# Patient Record
Sex: FEMALE | Race: WHITE | NOT HISPANIC OR LATINO | Employment: FULL TIME | URBAN - NONMETROPOLITAN AREA
[De-identification: names, ages, dates, MRNs, and addresses within clinical notes are randomized per-mention and may not be internally consistent; named-entity substitution may affect disease eponyms.]

---
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
## 2024-04-14 ENCOUNTER — OFFICE VISIT (OUTPATIENT)
Dept: FAMILY MEDICINE | Facility: CLINIC | Age: 19
End: 2024-04-14

## 2024-04-14 VITALS
RESPIRATION RATE: 18 BRPM | HEART RATE: 110 BPM | WEIGHT: 101 LBS | OXYGEN SATURATION: 98 % | HEIGHT: 65 IN | BODY MASS INDEX: 16.83 KG/M2 | SYSTOLIC BLOOD PRESSURE: 113 MMHG | TEMPERATURE: 98 F | DIASTOLIC BLOOD PRESSURE: 78 MMHG

## 2024-04-14 DIAGNOSIS — N75.0 INFECTED CYST OF BARTHOLIN'S GLAND DUCT: Primary | ICD-10-CM

## 2024-04-14 PROCEDURE — 99051 MED SERV EVE/WKEND/HOLIDAY: CPT | Mod: ,,, | Performed by: FAMILY MEDICINE

## 2024-04-14 PROCEDURE — 96372 THER/PROPH/DIAG INJ SC/IM: CPT | Mod: ,,, | Performed by: FAMILY MEDICINE

## 2024-04-14 PROCEDURE — 99213 OFFICE O/P EST LOW 20 MIN: CPT | Mod: 25,,, | Performed by: FAMILY MEDICINE

## 2024-04-14 RX ORDER — KETOROLAC TROMETHAMINE 30 MG/ML
30 INJECTION, SOLUTION INTRAMUSCULAR; INTRAVENOUS
Status: COMPLETED | OUTPATIENT
Start: 2024-04-14 | End: 2024-04-14

## 2024-04-14 RX ORDER — CEFTRIAXONE 500 MG/1
500 INJECTION, POWDER, FOR SOLUTION INTRAMUSCULAR; INTRAVENOUS
Status: COMPLETED | OUTPATIENT
Start: 2024-04-14 | End: 2024-04-14

## 2024-04-14 RX ORDER — CLINDAMYCIN HYDROCHLORIDE 300 MG/1
300 CAPSULE ORAL 3 TIMES DAILY
Qty: 21 CAPSULE | Refills: 0 | Status: SHIPPED | OUTPATIENT
Start: 2024-04-14 | End: 2024-04-21

## 2024-04-14 RX ORDER — IBUPROFEN 600 MG/1
600 TABLET ORAL EVERY 6 HOURS PRN
Qty: 20 TABLET | Refills: 0 | Status: SHIPPED | OUTPATIENT
Start: 2024-04-14 | End: 2024-05-29

## 2024-04-14 RX ADMIN — KETOROLAC TROMETHAMINE 30 MG: 30 INJECTION, SOLUTION INTRAMUSCULAR; INTRAVENOUS at 05:04

## 2024-04-14 RX ADMIN — CEFTRIAXONE 500 MG: 500 INJECTION, POWDER, FOR SOLUTION INTRAMUSCULAR; INTRAVENOUS at 05:04

## 2024-04-14 NOTE — LETTER
April 14, 2024      [facility]  [address] [facility] [place] [address]  Phone: [phone]  Fax: [phone]       Patient: Merle Hinojosa   YOB: 2005  Date of Visit: 04/14/2024    To Whom It May Concern:    Jimmy Hinojosa  was at [facility] on 04/14/2024. The patient may return to work/school on 04/18/2024 with no restrictions. If you have any questions or concerns, or if I can be of further assistance, please do not hesitate to contact me.    Sincerely,    Theo Salas LPN

## 2024-04-14 NOTE — PROGRESS NOTES
Subjective:       Patient ID: Merle Hinojosa is a 18 y.o. female.    Chief Complaint: Vaginal Pain (Having some pains since this morning. Had a cyst about 6 months ago and was treated.)    Pt reports having infected cyst 6 months ago in her vagina. Was treated and resolved then but similar feeling started a couple of days ago. No discharge. Patient does not have a gynecologist.     Vaginal Pain  The patient's pertinent negatives include no pelvic pain. Pertinent negatives include no abdominal pain, back pain, chills, constipation, diarrhea, dysuria, fever, flank pain, frequency, headaches, hematuria, nausea, rash, sore throat, urgency or vomiting.     Review of Systems   Constitutional:  Negative for activity change, appetite change, chills, diaphoresis, fatigue, fever and unexpected weight change.   HENT:  Negative for nasal congestion, dental problem, drooling, ear discharge, ear pain, facial swelling, hearing loss, mouth sores, nosebleeds, postnasal drip, rhinorrhea, sinus pressure/congestion, sneezing, sore throat, tinnitus, trouble swallowing, voice change and goiter.    Eyes:  Negative for photophobia, discharge, itching and visual disturbance.   Respiratory:  Negative for apnea, cough, choking, chest tightness, shortness of breath, wheezing and stridor.    Cardiovascular:  Negative for chest pain, palpitations, leg swelling and claudication.   Gastrointestinal:  Negative for abdominal distention, abdominal pain, anal bleeding, blood in stool, change in bowel habit, constipation, diarrhea, nausea and vomiting.   Endocrine: Negative for cold intolerance, heat intolerance, polydipsia, polyphagia and polyuria.   Genitourinary:  Positive for vaginal pain. Negative for bladder incontinence, decreased urine volume, difficulty urinating, dysuria, enuresis, flank pain, frequency, hematuria, nocturia, pelvic pain and urgency.   Musculoskeletal:  Negative for arthralgias, back pain, gait problem, joint swelling, leg  pain, myalgias, neck pain, neck stiffness and joint deformity.   Integumentary:  Negative for pallor, rash, wound, breast mass and breast tenderness.   Allergic/Immunologic: Negative for environmental allergies, food allergies and immunocompromised state.   Neurological:  Negative for dizziness, vertigo, tremors, seizures, syncope, facial asymmetry, speech difficulty, weakness, light-headedness, numbness, headaches, memory loss and coordination difficulties.   Hematological:  Negative for adenopathy. Does not bruise/bleed easily.   Psychiatric/Behavioral:  Negative for agitation, behavioral problems, confusion, decreased concentration, dysphoric mood, hallucinations, self-injury, sleep disturbance and suicidal ideas. The patient is not nervous/anxious and is not hyperactive.    Breast: Negative for mass and tenderness        Objective:      Physical Exam  Vitals reviewed.   Constitutional:       Appearance: Normal appearance.   HENT:      Head: Normocephalic and atraumatic.      Right Ear: Tympanic membrane, ear canal and external ear normal.      Left Ear: Tympanic membrane, ear canal and external ear normal.      Nose: Nose normal.      Mouth/Throat:      Mouth: Mucous membranes are moist.      Pharynx: Oropharynx is clear.   Eyes:      Extraocular Movements: Extraocular movements intact.      Conjunctiva/sclera: Conjunctivae normal.      Pupils: Pupils are equal, round, and reactive to light.   Cardiovascular:      Rate and Rhythm: Normal rate and regular rhythm.      Pulses: Normal pulses.      Heart sounds: Normal heart sounds.   Pulmonary:      Effort: Pulmonary effort is normal.      Breath sounds: Normal breath sounds.   Abdominal:      General: Bowel sounds are normal.      Palpations: Abdomen is soft.   Genitourinary:     Comments: Swollen cyst near vaginal introitus, very ttp, not draining. Female Chaperone nurse Arturo present during exam.   Musculoskeletal:         General: Normal range of motion.       Cervical back: Normal range of motion and neck supple.   Skin:     General: Skin is warm and dry.   Neurological:      General: No focal deficit present.      Mental Status: She is alert. Mental status is at baseline.   Psychiatric:         Mood and Affect: Mood normal.         Behavior: Behavior normal.         Thought Content: Thought content normal.         Judgment: Judgment normal.         Assessment:       1. Infected cyst of Bartholin's gland duct        Plan:     Infected cyst of Bartholin's gland duct  -     clindamycin (CLEOCIN) 300 MG capsule; Take 1 capsule (300 mg total) by mouth 3 (three) times daily. for 7 days  Dispense: 21 capsule; Refill: 0  -     ibuprofen (ADVIL,MOTRIN) 600 MG tablet; Take 1 tablet (600 mg total) by mouth every 6 (six) hours as needed for Pain.  Dispense: 20 tablet; Refill: 0  -     ketorolac injection 30 mg  -     cefTRIAXone injection 500 mg       Will treat for infected cyst, recommend patient f/u with gynecology if antibiotics and warm compresses do not resolve this.

## 2024-04-30 DIAGNOSIS — Z32.00 VISIT FOR CONFIRMATION OF PREGNANCY TEST RESULT WITH PHYSICAL EXAM: Primary | ICD-10-CM

## 2024-05-21 ENCOUNTER — HOSPITAL ENCOUNTER (OUTPATIENT)
Dept: RADIOLOGY | Facility: HOSPITAL | Age: 19
Discharge: HOME | End: 2024-05-21
Attending: OBSTETRICS & GYNECOLOGY
Payer: MEDICAID

## 2024-05-21 ENCOUNTER — OFFICE VISIT (OUTPATIENT)
Dept: OBSTETRICS AND GYNECOLOGY | Facility: CLINIC | Age: 19
End: 2024-05-21
Payer: MEDICAID

## 2024-05-21 VITALS
BODY MASS INDEX: 16.83 KG/M2 | SYSTOLIC BLOOD PRESSURE: 121 MMHG | HEIGHT: 65 IN | WEIGHT: 101 LBS | DIASTOLIC BLOOD PRESSURE: 68 MMHG | HEART RATE: 118 BPM

## 2024-05-21 DIAGNOSIS — Z11.3 SCREENING EXAMINATION FOR VENEREAL DISEASE: ICD-10-CM

## 2024-05-21 DIAGNOSIS — Z32.00 VISIT FOR CONFIRMATION OF PREGNANCY TEST RESULT WITH PHYSICAL EXAM: Primary | ICD-10-CM

## 2024-05-21 DIAGNOSIS — Z32.00 VISIT FOR CONFIRMATION OF PREGNANCY TEST RESULT WITH PHYSICAL EXAM: ICD-10-CM

## 2024-05-21 DIAGNOSIS — Z72.51 HIGH RISK HETEROSEXUAL BEHAVIOR: ICD-10-CM

## 2024-05-21 DIAGNOSIS — Z34.01 ENCOUNTER FOR SUPERVISION OF NORMAL FIRST PREGNANCY IN FIRST TRIMESTER: ICD-10-CM

## 2024-05-21 PROCEDURE — 76801 OB US < 14 WKS SINGLE FETUS: CPT | Mod: TC

## 2024-05-21 PROCEDURE — G0481 DRUG TEST DEF 8-14 CLASSES: HCPCS | Mod: ,,, | Performed by: CLINICAL MEDICAL LABORATORY

## 2024-05-21 PROCEDURE — 1159F MED LIST DOCD IN RCRD: CPT | Mod: CPTII,,, | Performed by: OBSTETRICS & GYNECOLOGY

## 2024-05-21 PROCEDURE — 87591 N.GONORRHOEAE DNA AMP PROB: CPT | Mod: ,,, | Performed by: CLINICAL MEDICAL LABORATORY

## 2024-05-21 PROCEDURE — 3074F SYST BP LT 130 MM HG: CPT | Mod: CPTII,,, | Performed by: OBSTETRICS & GYNECOLOGY

## 2024-05-21 PROCEDURE — 99203 OFFICE O/P NEW LOW 30 MIN: CPT | Mod: S$PBB,TH,, | Performed by: OBSTETRICS & GYNECOLOGY

## 2024-05-21 PROCEDURE — 87491 CHLMYD TRACH DNA AMP PROBE: CPT | Mod: ,,, | Performed by: CLINICAL MEDICAL LABORATORY

## 2024-05-21 PROCEDURE — 1160F RVW MEDS BY RX/DR IN RCRD: CPT | Mod: CPTII,,, | Performed by: OBSTETRICS & GYNECOLOGY

## 2024-05-21 PROCEDURE — 3078F DIAST BP <80 MM HG: CPT | Mod: CPTII,,, | Performed by: OBSTETRICS & GYNECOLOGY

## 2024-05-21 PROCEDURE — 76801 OB US < 14 WKS SINGLE FETUS: CPT | Mod: 26,,, | Performed by: RADIOLOGY

## 2024-05-21 PROCEDURE — 87661 TRICHOMONAS VAGINALIS AMPLIF: CPT | Mod: ,,, | Performed by: CLINICAL MEDICAL LABORATORY

## 2024-05-21 PROCEDURE — 3008F BODY MASS INDEX DOCD: CPT | Mod: CPTII,,, | Performed by: OBSTETRICS & GYNECOLOGY

## 2024-05-21 PROCEDURE — 99213 OFFICE O/P EST LOW 20 MIN: CPT | Mod: PBBFAC,25 | Performed by: OBSTETRICS & GYNECOLOGY

## 2024-05-21 PROCEDURE — 87086 URINE CULTURE/COLONY COUNT: CPT | Mod: ,,, | Performed by: CLINICAL MEDICAL LABORATORY

## 2024-05-21 NOTE — PROGRESS NOTES
"Subjective:      Merle Hinojosa is being seen today for her first obstetrical visit.  This is not a planned pregnancy. She is at  8 w 6d gestation. Her obstetrical history is significant for  nothing . Relationship with FOB: significant other, not living together. Patient does intend to breast feed. Pregnancy history fully reviewed.  Denies vaginal bleeding, abd pain or cramping.    Menstrual History:  OB History    No obstetric history on file.          Patient's last menstrual period was 03/24/2024 (exact date).  EDC by LMP 12/29/2024**  US today +IUP@ 8+6 wks +FHTs 170s JANETT 12/25/2024.       The following portions of the patient's history were reviewed and updated as appropriate: allergies, current medications, past family history, past medical history, past social history, past surgical history, and problem list.    Review of Systems  Pertinent items are noted in HPI.      Objective:      /68 (BP Location: Right arm, Patient Position: Sitting)   Pulse (!) 118   Ht 5' 5" (1.651 m)   Wt 45.8 kg (101 lb)   LMP 03/24/2024 (Exact Date)   BMI 16.81 kg/m²   General appearance: alert, appears stated age, cooperative, and no distress  Head: Normocephalic, without obvious abnormality, atraumatic  Lungs: clear to auscultation bilaterally and even/unlabored  Heart: regular rate and rhythm  Abdomen: soft, non-tender  Extremities: extremities normal, atraumatic, no cyanosis or edema  Skin: Skin color, texture, turgor normal. No rashes or lesions      Assessment:     Secondary Amenorrhea   Pregnancy at 8 and 6/7 weeks        Plan:      Initial labs drawn.  Rimma Screen discussed.  Prenatal vitamins.  Problem list reviewed and updated.  New OB booklet given to pt to review.  Reviewed healthy diet, exercise during pregnancy and weight gain recommendations.  Safe OTC med list given and reviewed.  Discussed danger s/s to report including bleeding precautions.    Follow up in 4 weeks.     "

## 2024-05-22 LAB
CHLAMYDIA BY PCR: NEGATIVE
N. GONORRHOEAE (GC) BY PCR: NEGATIVE
TRICHOMONAS NAT: NEGATIVE

## 2024-05-23 LAB
7-AMINOCLONAZEPAM, URINE (RUSH): NEGATIVE 25 NG/ML
A-HYDROXYALPRAZOLAM, URINE (RUSH): NEGATIVE 25 NG/ML
AMITRIPTYLINE, URINE (RUSH): NEGATIVE 25 NG/ML
BENZOYLECGONINE, URINE (RUSH): NEGATIVE 100 NG/ML
CARISOPRODOL, URINE (RUSH): NEGATIVE 100 NG/ML
CODEINE, URINE (RUSH): NEGATIVE 25 NG/ML
CREAT UR-MCNC: 25 MG/DL (ref 28–219)
EDDP, URINE (RUSH): NEGATIVE 25 NG/ML
HYDROCODONE, URINE (RUSH): NEGATIVE 25 NG/ML
HYDROMORPHONE, URINE (RUSH): NEGATIVE 25 NG/ML
MEPROBAMATE, URINE (RUSH): NEGATIVE 100 NG/ML
METHADONE UR QL SCN: NEGATIVE 25 NG/ML
METHAMPHET UR QL SCN: NEGATIVE
MORPHINE, URINE (RUSH): NEGATIVE 25 NG/ML
NORDIAZEPAM, URINE (RUSH): NEGATIVE 25 NG/ML
NORHYDROCODONE, URINE (RUSH): NEGATIVE 50 NG/ML
NOROXYCODONE HCL, URINE (RUSH): NEGATIVE 50 NG/ML
OXYCODONE UR QL SCN: NEGATIVE 25 NG/ML
OXYMORPHONE, URINE (RUSH): NEGATIVE 25 NG/ML
PH UR STRIP: 5.5 PH UNITS
SP GR UR STRIP: 1
TEMAZEPAM, URINE (RUSH): NEGATIVE 25 NG/ML
THC-COOH, URINE (RUSH): NEGATIVE 25 NG/ML
UA COMPLETE W REFLEX CULTURE PNL UR: NORMAL

## 2024-06-18 NOTE — PROGRESS NOTES
"Return OB Visit    19 y.o. female  at 13 wks  She c/o n/v and desires Rx for this. Rx for Zofran provided.  Reports no fetal movement or fluttering. Denies any vaginal bleeding, leakage of fluid, cramping, contractions, or pressure.   Total weight gain/weight loss in pregnancy: Not found.     Vitals  BP: 135/81  Pulse: 96  Height: 5' 5" (165.1 cm)  Weight: 45.5 kg (100 lb 6.4 oz)  FHTs +150 bpm.  No edema.    Prenatal Labs:  Lab Results   Component Value Date    GROUPTRH A POS 2024    HGB 13.9 2024    HCT 41.9 2024     2024    SICKLE Negative 2024    HEPBSAG Non-Reactive 2024    [id number] Non-Reactive 2024    LABNGO Negative 2024    LABURIN  2024     Mixed Skin/Urogenital Tanya Isolated, no further workup.       A: Unknown           ICD-10-CM ICD-9-CM    1. 13 weeks gestation of pregnancy  Z3A.13 V22.2 POCT URINALYSIS W/O SCOPE      2. Nausea and vomiting during pregnancy  O21.9 643.90 ondansetron (ZOFRAN) 4 MG tablet      3. Encounter for supervision of normal first pregnancy in second trimester  Z34.02 V22.0           No pregnancy episode    -Benefits of breastfeeding   -Rooming In and Skin to Skin    P: Bleeding, daily fetal kick counts, and  labor/labor precautions discussed.    Questions answered to desired level of satisfaction  Verbalized understanding to all information and instructions provided.  Follow up in about 4 weeks (around 2024) for CYNTHIA Garcia MD     "

## 2024-06-20 ENCOUNTER — OFFICE VISIT (OUTPATIENT)
Dept: OBSTETRICS AND GYNECOLOGY | Facility: CLINIC | Age: 19
End: 2024-06-20
Payer: MEDICAID

## 2024-06-20 VITALS
HEIGHT: 65 IN | DIASTOLIC BLOOD PRESSURE: 81 MMHG | WEIGHT: 100.38 LBS | BODY MASS INDEX: 16.72 KG/M2 | HEART RATE: 96 BPM | SYSTOLIC BLOOD PRESSURE: 135 MMHG

## 2024-06-20 DIAGNOSIS — Z3A.13 13 WEEKS GESTATION OF PREGNANCY: Primary | ICD-10-CM

## 2024-06-20 DIAGNOSIS — Z34.02 ENCOUNTER FOR SUPERVISION OF NORMAL FIRST PREGNANCY IN SECOND TRIMESTER: ICD-10-CM

## 2024-06-20 DIAGNOSIS — O21.9 NAUSEA AND VOMITING DURING PREGNANCY: ICD-10-CM

## 2024-06-20 LAB
BILIRUB SERPL-MCNC: NORMAL MG/DL
BLOOD URINE, POC: NORMAL
COLOR, POC UA: YELLOW
GLUCOSE UR QL STRIP: NORMAL
KETONES UR QL STRIP: NORMAL
LEUKOCYTE ESTERASE URINE, POC: NORMAL
NITRITE, POC UA: NORMAL
PH, POC UA: 7.5
PROTEIN, POC: NORMAL
SPECIFIC GRAVITY, POC UA: 1.01
UROBILINOGEN, POC UA: 0.2

## 2024-06-20 PROCEDURE — 3075F SYST BP GE 130 - 139MM HG: CPT | Mod: CPTII,,, | Performed by: OBSTETRICS & GYNECOLOGY

## 2024-06-20 PROCEDURE — 3079F DIAST BP 80-89 MM HG: CPT | Mod: CPTII,,, | Performed by: OBSTETRICS & GYNECOLOGY

## 2024-06-20 PROCEDURE — 81003 URINALYSIS AUTO W/O SCOPE: CPT | Mod: PBBFAC | Performed by: OBSTETRICS & GYNECOLOGY

## 2024-06-20 PROCEDURE — 1160F RVW MEDS BY RX/DR IN RCRD: CPT | Mod: CPTII,,, | Performed by: OBSTETRICS & GYNECOLOGY

## 2024-06-20 PROCEDURE — 99213 OFFICE O/P EST LOW 20 MIN: CPT | Mod: PBBFAC | Performed by: OBSTETRICS & GYNECOLOGY

## 2024-06-20 PROCEDURE — 99999 PR PBB SHADOW E&M-EST. PATIENT-LVL III: CPT | Mod: PBBFAC,,, | Performed by: OBSTETRICS & GYNECOLOGY

## 2024-06-20 PROCEDURE — 1159F MED LIST DOCD IN RCRD: CPT | Mod: CPTII,,, | Performed by: OBSTETRICS & GYNECOLOGY

## 2024-06-20 PROCEDURE — 99213 OFFICE O/P EST LOW 20 MIN: CPT | Mod: S$PBB,TH,, | Performed by: OBSTETRICS & GYNECOLOGY

## 2024-06-20 PROCEDURE — 99999PBSHW POCT URINALYSIS W/O SCOPE: Mod: PBBFAC,,,

## 2024-06-20 RX ORDER — ONDANSETRON 4 MG/1
4 TABLET, FILM COATED ORAL EVERY 8 HOURS PRN
Qty: 60 TABLET | Refills: 6 | Status: SHIPPED | OUTPATIENT
Start: 2024-06-20

## 2024-07-18 ENCOUNTER — ROUTINE PRENATAL (OUTPATIENT)
Dept: OBSTETRICS AND GYNECOLOGY | Facility: CLINIC | Age: 19
End: 2024-07-18
Payer: MEDICAID

## 2024-07-18 VITALS
WEIGHT: 102.81 LBS | HEART RATE: 103 BPM | SYSTOLIC BLOOD PRESSURE: 109 MMHG | DIASTOLIC BLOOD PRESSURE: 65 MMHG | BODY MASS INDEX: 17.11 KG/M2

## 2024-07-18 DIAGNOSIS — Z3A.17 17 WEEKS GESTATION OF PREGNANCY: Primary | ICD-10-CM

## 2024-07-18 DIAGNOSIS — Z34.02 ENCOUNTER FOR SUPERVISION OF NORMAL FIRST PREGNANCY IN SECOND TRIMESTER: ICD-10-CM

## 2024-07-18 PROCEDURE — 81003 URINALYSIS AUTO W/O SCOPE: CPT | Mod: PBBFAC | Performed by: OBSTETRICS & GYNECOLOGY

## 2024-07-18 PROCEDURE — 99213 OFFICE O/P EST LOW 20 MIN: CPT | Mod: S$PBB,TH,, | Performed by: OBSTETRICS & GYNECOLOGY

## 2024-07-18 PROCEDURE — 99999 PR PBB SHADOW E&M-EST. PATIENT-LVL III: CPT | Mod: PBBFAC,,, | Performed by: OBSTETRICS & GYNECOLOGY

## 2024-07-18 PROCEDURE — 99999PBSHW POCT URINALYSIS W/O SCOPE: Mod: PBBFAC,,,

## 2024-07-18 PROCEDURE — 99213 OFFICE O/P EST LOW 20 MIN: CPT | Mod: PBBFAC | Performed by: OBSTETRICS & GYNECOLOGY

## 2024-07-18 NOTE — PROGRESS NOTES
Return OB Visit    19 y.o. female  at 17w1d   She c/o nothing today. She feels like her nausea has almost resolved. She has gained 2 lbs since her last visit. Reviewed her Panorama was low risk and her Horizon carrier screen was negative.   Reports some fetal movement or fluttering. Denies any vaginal bleeding, leakage of fluid, cramping, contractions, or pressure.   Total weight gain/weight loss in pregnancy: 0.816 kg (1 lb 12.8 oz)     Vitals  BP: 109/65  Pulse: 103  Weight: 46.6 kg (102 lb 12.8 oz)  Prenatal  Fetal Heart Rate: 145  Movement: Present  Edema  LLE Edema: None  RLE Edema: None  Facial: None  Additional Edema?: No    Prenatal Labs:  Lab Results   Component Value Date    GROUPTRH A POS 2024    HGB 13.9 2024    HCT 41.9 2024     2024    SICKLE Negative 2024    HEPBSAG Non-Reactive 2024    [id number] Non-Reactive 2024    LABNGO Negative 2024    LABURIN  2024     Mixed Skin/Urogenital Tanya Isolated, no further workup.       A: 17w1d           ICD-10-CM ICD-9-CM    1. 17 weeks gestation of pregnancy  Z3A.17 V22.2 POCT URINALYSIS W/O SCOPE      2. Encounter for supervision of normal first pregnancy in second trimester  Z34.02 V22.0 US OB 14+ Wks, TransAbd, Single Gestation          No pregnancy checklist tasks were completed during this visit, and no tasks are pending completion.    -Benefits of breastfeeding   -Rooming In and Skin to Skin    P: Bleeding, daily fetal kick counts, and  labor/labor precautions discussed.    Questions answered to desired level of satisfaction  Verbalized understanding to all information and instructions provided.  Follow up in about 3 weeks (around 2024) for anatomy US with Dr. Caban and DIANNE farias.    Micky Garcia MD

## 2024-07-18 NOTE — PROGRESS NOTES
Return OB Visit    19 y.o. female  at 17w1d   She c/o heartburn and swelling in feet.  Reports no fetal movement or fluttering as of right now. Denies any vaginal bleeding, leakage of fluid, cramping, contractions, or pressure.   Total weight gain/weight loss in pregnancy: Not found.     Vitals  BP: 109/65  Pulse: 103  Weight: 46.6 kg (102 lb 12.8 oz)    Prenatal Labs:  Lab Results   Component Value Date    GROUPTRH A POS 2024    HGB 13.9 2024    HCT 41.9 2024     2024    SICKLE Negative 2024    HEPBSAG Non-Reactive 2024    [id number] Non-Reactive 2024    LABNGO Negative 2024    LABURIN  2024     Mixed Skin/Urogenital Tanya Isolated, no further workup.       A: 17w1d           ICD-10-CM ICD-9-CM    1. 17 weeks gestation of pregnancy  Z3A.17 V22.2 POCT URINALYSIS W/O SCOPE      2. Encounter for supervision of normal first pregnancy in second trimester  Z34.02 V22.0 US OB 14+ Wks, TransAbd, Single Gestation          No pregnancy checklist tasks were completed during this visit, and no tasks are pending completion.    -Benefits of breastfeeding   -Rooming In and Skin to Skin    P: Bleeding, daily fetal kick counts, and  labor/labor precautions discussed.    Questions answered to desired level of satisfaction  Verbalized understanding to all information and instructions provided.  Follow up in about 3 weeks (around 2024) for anatomy US with Dr. Caban and DIANNE visit..    Micky Garcia MD

## 2024-07-19 LAB
BILIRUB SERPL-MCNC: NORMAL MG/DL
BLOOD URINE, POC: NORMAL
CLARITY, POC UA: NORMAL
COLOR, POC UA: NORMAL
GLUCOSE UR QL STRIP: NORMAL
KETONES UR QL STRIP: NORMAL
LEUKOCYTE ESTERASE URINE, POC: NORMAL
NITRITE, POC UA: NORMAL
PH, POC UA: 7.5
PROTEIN, POC: NORMAL
SPECIFIC GRAVITY, POC UA: 1.02
UROBILINOGEN, POC UA: 0.2

## 2024-07-29 ENCOUNTER — PATIENT MESSAGE (OUTPATIENT)
Dept: OBSTETRICS AND GYNECOLOGY | Facility: CLINIC | Age: 19
End: 2024-07-29
Payer: MEDICAID

## 2024-08-06 ENCOUNTER — ROUTINE PRENATAL (OUTPATIENT)
Dept: OBSTETRICS AND GYNECOLOGY | Facility: CLINIC | Age: 19
End: 2024-08-06
Payer: MEDICAID

## 2024-08-06 ENCOUNTER — HOSPITAL ENCOUNTER (OUTPATIENT)
Dept: RADIOLOGY | Facility: HOSPITAL | Age: 19
Discharge: HOME | End: 2024-08-06
Attending: OBSTETRICS & GYNECOLOGY
Payer: MEDICAID

## 2024-08-06 VITALS
DIASTOLIC BLOOD PRESSURE: 72 MMHG | HEART RATE: 90 BPM | SYSTOLIC BLOOD PRESSURE: 116 MMHG | BODY MASS INDEX: 17.31 KG/M2 | WEIGHT: 104 LBS

## 2024-08-06 DIAGNOSIS — Z34.02 ENCOUNTER FOR SUPERVISION OF NORMAL FIRST PREGNANCY IN SECOND TRIMESTER: ICD-10-CM

## 2024-08-06 DIAGNOSIS — Z3A.19 19 WEEKS GESTATION OF PREGNANCY: Primary | ICD-10-CM

## 2024-08-06 LAB
BILIRUB SERPL-MCNC: NORMAL MG/DL
BLOOD URINE, POC: NORMAL
CLARITY, POC UA: CLEAR
COLOR, POC UA: YELLOW
GLUCOSE UR QL STRIP: NORMAL
KETONES UR QL STRIP: NORMAL
LEUKOCYTE ESTERASE URINE, POC: NORMAL
NITRITE, POC UA: NORMAL
PH, POC UA: 7
PROTEIN, POC: NORMAL
SPECIFIC GRAVITY, POC UA: 1.01
UROBILINOGEN, POC UA: NORMAL

## 2024-08-06 PROCEDURE — 81003 URINALYSIS AUTO W/O SCOPE: CPT | Mod: PBBFAC | Performed by: OBSTETRICS & GYNECOLOGY

## 2024-08-06 PROCEDURE — 99999PBSHW POCT URINALYSIS W/O SCOPE: Mod: PBBFAC,,,

## 2024-08-06 PROCEDURE — 76805 OB US >/= 14 WKS SNGL FETUS: CPT | Mod: TC

## 2024-08-06 PROCEDURE — 99213 OFFICE O/P EST LOW 20 MIN: CPT | Mod: S$PBB,TH,, | Performed by: OBSTETRICS & GYNECOLOGY

## 2024-08-06 PROCEDURE — 99999 PR PBB SHADOW E&M-EST. PATIENT-LVL III: CPT | Mod: PBBFAC,,, | Performed by: OBSTETRICS & GYNECOLOGY

## 2024-08-06 PROCEDURE — 99213 OFFICE O/P EST LOW 20 MIN: CPT | Mod: PBBFAC,25 | Performed by: OBSTETRICS & GYNECOLOGY

## 2024-08-15 ENCOUNTER — PATIENT MESSAGE (OUTPATIENT)
Dept: OBSTETRICS AND GYNECOLOGY | Facility: CLINIC | Age: 19
End: 2024-08-15
Payer: MEDICAID

## 2024-08-15 ENCOUNTER — TELEPHONE (OUTPATIENT)
Dept: OBSTETRICS AND GYNECOLOGY | Facility: CLINIC | Age: 19
End: 2024-08-15
Payer: MEDICAID

## 2024-08-15 NOTE — TELEPHONE ENCOUNTER
Success - spoke with patient advised her to stay hydrated and cool. If needed pt was advised to go to L&D     ----- Message from Jaimie Nuñez sent at 8/15/2024  1:54 PM CDT -----  Regarding: Issue  Who Called: Merle Hinojosa    Caller is requesting assistance/information from provider's office.    Symptoms (please be specific): Patient is 20 week she said she was having gas pain throwing up liquid stuff and was feeling dizzy.Do she need to be concern  How long has patient had these symptoms:gas pain throwing up  liquid stuff and was feeling dizzy 5 min ago  List of preferred pharmacies on file (remove unneeded): [unfilled]  If different, enter pharmacy into here including location and phone number:       Preferred Method of Contact: Phone Call  Patient's Preferred Phone Number on File: [phone]   Best Call Back Number, if different:  Additional Information:

## 2024-09-03 ENCOUNTER — ROUTINE PRENATAL (OUTPATIENT)
Dept: OBSTETRICS AND GYNECOLOGY | Facility: CLINIC | Age: 19
End: 2024-09-03
Payer: MEDICAID

## 2024-09-03 VITALS
DIASTOLIC BLOOD PRESSURE: 69 MMHG | WEIGHT: 111.13 LBS | HEART RATE: 99 BPM | BODY MASS INDEX: 18.49 KG/M2 | SYSTOLIC BLOOD PRESSURE: 113 MMHG

## 2024-09-03 DIAGNOSIS — Z3A.23 23 WEEKS GESTATION OF PREGNANCY: Primary | ICD-10-CM

## 2024-09-03 DIAGNOSIS — Z34.02 ENCOUNTER FOR PRENATAL CARE OF FIRST PREGNANCY, SECOND TRIMESTER: ICD-10-CM

## 2024-09-03 LAB
BILIRUB SERPL-MCNC: NORMAL MG/DL
BLOOD URINE, POC: NORMAL
CLARITY, POC UA: CLEAR
COLOR, POC UA: YELLOW
GLUCOSE UR QL STRIP: NORMAL
KETONES UR QL STRIP: NORMAL
LEUKOCYTE ESTERASE URINE, POC: NORMAL
NITRITE, POC UA: NORMAL
PH, POC UA: 7.5
PROTEIN, POC: NORMAL
SPECIFIC GRAVITY, POC UA: 1.01
UROBILINOGEN, POC UA: 0.2

## 2024-09-03 PROCEDURE — 99999 PR PBB SHADOW E&M-EST. PATIENT-LVL III: CPT | Mod: PBBFAC,,, | Performed by: OBSTETRICS & GYNECOLOGY

## 2024-09-03 PROCEDURE — 99213 OFFICE O/P EST LOW 20 MIN: CPT | Mod: PBBFAC | Performed by: OBSTETRICS & GYNECOLOGY

## 2024-09-03 PROCEDURE — 59425 ANTEPARTUM CARE ONLY: CPT | Mod: S$PBB,TH,, | Performed by: OBSTETRICS & GYNECOLOGY

## 2024-09-03 PROCEDURE — 99999PBSHW POCT URINALYSIS W/O SCOPE: Mod: PBBFAC,,,

## 2024-09-03 PROCEDURE — 81003 URINALYSIS AUTO W/O SCOPE: CPT | Mod: PBBFAC | Performed by: OBSTETRICS & GYNECOLOGY

## 2024-09-03 NOTE — PROGRESS NOTES
Return OB Visit    19 y.o. female  at 23w6d   She c/o nothing at this time. She requests a refill on her Epipen due to her current ones being . 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 She was referred downstairs to primary care for this.   Reports good fetal movement or fluttering. Denies any vaginal bleeding, leakage of fluid, cramping, contractions, or pressure.   Total weight gain/weight loss in pregnancy: 4.581 kg (10 lb 1.6 oz)     Vitals  BP: 113/69  Pulse: 99  Weight: 50.4 kg (111 lb 1.6 oz)  Prenatal  Fundal Height (cm): 24 cm  Fetal Heart Rate: 145  Movement: Present  Edema  LLE Edema: None  RLE Edema: None  Facial: None  Additional Edema?: No    Prenatal Labs:  Lab Results   Component Value Date    GROUPTRH A POS 2024    HGB 13.9 2024    HCT 41.9 2024     2024    SICKLE Negative 2024    HEPBSAG Non-Reactive 2024    [id number] Non-Reactive 2024    LABNGO Negative 2024    LABURIN  2024     Mixed Skin/Urogenital Tanya Isolated, no further workup.       A: 23w6d           ICD-10-CM ICD-9-CM    1. 23 weeks gestation of pregnancy  Z3A.23 V22.2 POCT URINALYSIS W/O SCOPE      2. Encounter for prenatal care of first pregnancy, second trimester  Z34.02 V22.0 Glucose, 1Hr Post Prandial      Treponema Pallidum (Syphillis) Antibody      CBC Auto Differential      HIV 1/2 Ag/Ab (4th Gen)          No pregnancy checklist tasks were completed during this visit, and no tasks are pending completion.    -Benefits of breastfeeding   -Rooming In and Skin to Skin    P: Bleeding, daily fetal kick counts, and  labor/labor precautions discussed.    Questions answered to desired level of satisfaction  Verbalized understanding to all information and instructions provided.  Follow up in about 4 weeks (around 10/1/2024) for CYNTHIA Garcia MD

## 2024-10-01 ENCOUNTER — ROUTINE PRENATAL (OUTPATIENT)
Dept: OBSTETRICS AND GYNECOLOGY | Facility: CLINIC | Age: 19
End: 2024-10-01
Payer: MEDICAID

## 2024-10-01 VITALS
BODY MASS INDEX: 19.44 KG/M2 | DIASTOLIC BLOOD PRESSURE: 66 MMHG | WEIGHT: 116.81 LBS | HEART RATE: 95 BPM | SYSTOLIC BLOOD PRESSURE: 107 MMHG

## 2024-10-01 DIAGNOSIS — Z34.02 ENCOUNTER FOR SUPERVISION OF NORMAL FIRST PREGNANCY IN SECOND TRIMESTER: ICD-10-CM

## 2024-10-01 DIAGNOSIS — Z3A.27 27 WEEKS GESTATION OF PREGNANCY: Primary | ICD-10-CM

## 2024-10-01 DIAGNOSIS — O26.842 UTERINE SIZE-DATE DISCREPANCY IN SECOND TRIMESTER: ICD-10-CM

## 2024-10-01 PROCEDURE — 99213 OFFICE O/P EST LOW 20 MIN: CPT | Mod: PBBFAC | Performed by: OBSTETRICS & GYNECOLOGY

## 2024-10-01 PROCEDURE — 99999 PR PBB SHADOW E&M-EST. PATIENT-LVL III: CPT | Mod: PBBFAC,,, | Performed by: OBSTETRICS & GYNECOLOGY

## 2024-10-01 PROCEDURE — 59425 ANTEPARTUM CARE ONLY: CPT | Mod: S$PBB,TH,, | Performed by: OBSTETRICS & GYNECOLOGY

## 2024-10-01 NOTE — PROGRESS NOTES
Return OB Visit    19 y.o. female  at 27w6d   She c/o nothing. She is doing well. She is doing her 28 wk labs today.  Reports good fetal movement or fluttering. Denies any vaginal bleeding, leakage of fluid, cramping, contractions, or pressure.   Total weight gain/weight loss in pregnancy: 7.167 kg (15 lb 12.8 oz)     Vitals  BP: 107/66  Pulse: 95  Weight: 53 kg (116 lb 12.8 oz)  Prenatal  Fundal Height (cm): 24 cm  Fetal Heart Rate: 140  Movement: Present  Edema  LLE Edema: None  RLE Edema: None  Facial: None  Additional Edema?: No    Prenatal Labs:  Lab Results   Component Value Date    GROUPTRH A POS 2024    HGB 13.9 2024    HCT 41.9 2024     2024    SICKLE Negative 2024    HEPBSAG Non-Reactive 2024    [id number] Non-Reactive 2024    LABNGO Negative 2024    LABURIN  2024     Mixed Skin/Urogenital Tanya Isolated, no further workup.       A: 27w6d           ICD-10-CM ICD-9-CM    1. 27 weeks gestation of pregnancy  Z3A.27 V22.2 POCT URINALYSIS W/O SCOPE      2. Encounter for supervision of normal first pregnancy in second trimester  Z34.02 V22.0 US OB FU Ea Gestation Transabdominal      3. Uterine size-date discrepancy in second trimester  O26.842 649.63 US OB FU Ea Gestation Transabdominal          No pregnancy checklist tasks were completed during this visit, and no tasks are pending completion.    -Benefits of breastfeeding   -Rooming In and Skin to Skin    P: Bleeding, daily fetal kick counts, and  labor/labor precautions discussed.    Questions answered to desired level of satisfaction  Verbalized understanding to all information and instructions provided.  Follow up in about 2 weeks (around 10/15/2024) for DIANNE and growth US.    Micky Garcia MD

## 2024-10-15 ENCOUNTER — ROUTINE PRENATAL (OUTPATIENT)
Dept: OBSTETRICS AND GYNECOLOGY | Facility: CLINIC | Age: 19
End: 2024-10-15
Attending: OBSTETRICS & GYNECOLOGY
Payer: MEDICAID

## 2024-10-15 ENCOUNTER — HOSPITAL ENCOUNTER (OUTPATIENT)
Dept: RADIOLOGY | Facility: HOSPITAL | Age: 19
Discharge: HOME | End: 2024-10-15
Attending: OBSTETRICS & GYNECOLOGY
Payer: MEDICAID

## 2024-10-15 VITALS
HEART RATE: 93 BPM | BODY MASS INDEX: 20.3 KG/M2 | WEIGHT: 122 LBS | DIASTOLIC BLOOD PRESSURE: 74 MMHG | SYSTOLIC BLOOD PRESSURE: 131 MMHG

## 2024-10-15 DIAGNOSIS — Z34.03 ENCOUNTER FOR SUPERVISION OF NORMAL FIRST PREGNANCY IN THIRD TRIMESTER: ICD-10-CM

## 2024-10-15 DIAGNOSIS — O26.842 UTERINE SIZE-DATE DISCREPANCY IN SECOND TRIMESTER: ICD-10-CM

## 2024-10-15 DIAGNOSIS — Z3A.29 29 WEEKS GESTATION OF PREGNANCY: Primary | ICD-10-CM

## 2024-10-15 DIAGNOSIS — O35.EXX0 ENCOUNTER FOR REPEAT ULTRASOUND OF FETAL PYELECTASIS IN SINGLETON PREGNANCY, ANTEPARTUM: ICD-10-CM

## 2024-10-15 DIAGNOSIS — Z34.02 ENCOUNTER FOR SUPERVISION OF NORMAL FIRST PREGNANCY IN SECOND TRIMESTER: ICD-10-CM

## 2024-10-15 LAB
BILIRUB SERPL-MCNC: NORMAL MG/DL
BLOOD URINE, POC: NORMAL
CLARITY, UA: NORMAL
COLOR, UA: NORMAL
GLUCOSE UR QL STRIP: NORMAL
KETONES UR QL STRIP: NORMAL
LEUKOCYTE ESTERASE URINE, POC: NORMAL
NITRITE, POC UA: NORMAL
PH, POC UA: 7
PROTEIN, POC: NORMAL
SPECIFIC GRAVITY, POC UA: 1
UROBILINOGEN, POC UA: 0.2

## 2024-10-15 PROCEDURE — 81003 URINALYSIS AUTO W/O SCOPE: CPT | Mod: PBBFAC | Performed by: OBSTETRICS & GYNECOLOGY

## 2024-10-15 PROCEDURE — 76816 OB US FOLLOW-UP PER FETUS: CPT | Mod: TC

## 2024-10-15 PROCEDURE — 99999PBSHW POCT URINALYSIS W/O SCOPE: Mod: PBBFAC,,,

## 2024-10-15 PROCEDURE — 99999 PR PBB SHADOW E&M-EST. PATIENT-LVL III: CPT | Mod: PBBFAC,,, | Performed by: OBSTETRICS & GYNECOLOGY

## 2024-10-15 PROCEDURE — 76816 OB US FOLLOW-UP PER FETUS: CPT | Mod: 26,,, | Performed by: RADIOLOGY

## 2024-10-15 PROCEDURE — 59425 ANTEPARTUM CARE ONLY: CPT | Mod: S$PBB,TH,, | Performed by: OBSTETRICS & GYNECOLOGY

## 2024-10-15 PROCEDURE — 99213 OFFICE O/P EST LOW 20 MIN: CPT | Mod: PBBFAC,25 | Performed by: OBSTETRICS & GYNECOLOGY

## 2024-10-15 NOTE — PROGRESS NOTES
Return OB Visit    19 y.o. female  at 29w6d   She c/o feeling sad when taking PNVs. OK to not take them or try another brand.  Growth US obtained today. Bilateral renal pyelectasis noted. R 7 mm and L 6.4 mm.   US: vtx, pl fundal, DESTINY 11.5 cm, EFW 1472 g (3 lbs 4 oz) 51%.     Reports good fetal movement or fluttering. Denies any vaginal bleeding, leakage of fluid, cramping, contractions, or pressure.   Total weight gain/weight loss in pregnancy: 9.526 kg (21 lb)     Vitals  BP: 131/74  Pulse: 93  Weight: 55.3 kg (122 lb)  Prenatal  Fetal Heart Rate: 145  Movement: Absent  Edema  LLE Edema: None  RLE Edema: None  Facial: None  Additional Edema?: No  Cervical Exam  Presentation: Vertex    Prenatal Labs:  Lab Results   Component Value Date    GROUPTRH A POS 2024    HGB 11.6 (L) 10/01/2024    HCT 34.9 (L) 10/01/2024     10/01/2024    SICKLE Negative 2024    HEPBSAG Non-Reactive 2024    [id number] Non-Reactive 10/01/2024    LABNGO Negative 2024    LABURIN  2024     Mixed Skin/Urogenital Tanya Isolated, no further workup.       A: 29w6d           ICD-10-CM ICD-9-CM    1. 29 weeks gestation of pregnancy  Z3A.29 V22.2 POCT URINALYSIS W/O SCOPE      2. Encounter for supervision of normal first pregnancy in third trimester  Z34.03 V22.0       3. Encounter for repeat ultrasound of fetal pyelectasis in carrera pregnancy, antepartum  O35.EXX0 655.83 Ambulatory referral/consult to Perinatology          No pregnancy checklist tasks were completed during this visit, and no tasks are pending completion.    -Benefits of breastfeeding   -Rooming In and Skin to Skin    P: Bleeding, daily fetal kick counts, and  labor/labor precautions discussed.    Questions answered to desired level of satisfaction  Verbalized understanding to all information and instructions provided.  Follow up in about 2 weeks (around 10/29/2024) for DIANNE visit.    Will refer to M for fetal renal pyelectasis  bilaterally.    Micky Garcia MD

## 2024-10-29 ENCOUNTER — ROUTINE PRENATAL (OUTPATIENT)
Dept: OBSTETRICS AND GYNECOLOGY | Facility: CLINIC | Age: 19
End: 2024-10-29
Payer: MEDICAID

## 2024-10-29 VITALS
SYSTOLIC BLOOD PRESSURE: 117 MMHG | HEART RATE: 82 BPM | BODY MASS INDEX: 20.37 KG/M2 | WEIGHT: 122.38 LBS | DIASTOLIC BLOOD PRESSURE: 82 MMHG

## 2024-10-29 DIAGNOSIS — O35.EXX0 ENCOUNTER FOR REPEAT ULTRASOUND OF FETAL PYELECTASIS IN SINGLETON PREGNANCY, ANTEPARTUM: ICD-10-CM

## 2024-10-29 DIAGNOSIS — Z34.03 ENCOUNTER FOR SUPERVISION OF NORMAL FIRST PREGNANCY IN THIRD TRIMESTER: ICD-10-CM

## 2024-10-29 DIAGNOSIS — Z3A.31 31 WEEKS GESTATION OF PREGNANCY: Primary | ICD-10-CM

## 2024-10-29 PROCEDURE — 99999 PR PBB SHADOW E&M-EST. PATIENT-LVL III: CPT | Mod: PBBFAC,,, | Performed by: OBSTETRICS & GYNECOLOGY

## 2024-10-29 PROCEDURE — 99213 OFFICE O/P EST LOW 20 MIN: CPT | Mod: PBBFAC | Performed by: OBSTETRICS & GYNECOLOGY

## 2024-10-29 PROCEDURE — 59426 ANTEPARTUM CARE ONLY: CPT | Mod: S$PBB,TH,, | Performed by: OBSTETRICS & GYNECOLOGY

## 2024-10-29 PROCEDURE — 81003 URINALYSIS AUTO W/O SCOPE: CPT | Mod: PBBFAC | Performed by: OBSTETRICS & GYNECOLOGY

## 2024-10-29 PROCEDURE — 99999PBSHW POCT URINALYSIS W/O SCOPE: Mod: PBBFAC,,,

## 2024-11-01 LAB
BILIRUB SERPL-MCNC: NORMAL MG/DL
BLOOD URINE, POC: NORMAL
CLARITY, UA: NORMAL
COLOR, UA: NORMAL
GLUCOSE UR QL STRIP: NORMAL
KETONES UR QL STRIP: NORMAL
LEUKOCYTE ESTERASE URINE, POC: NORMAL
NITRITE, POC UA: NORMAL
PH, POC UA: 7.5
PROTEIN, POC: NORMAL
SPECIFIC GRAVITY, POC UA: 1.01
UROBILINOGEN, POC UA: 0.2

## 2024-11-12 ENCOUNTER — ROUTINE PRENATAL (OUTPATIENT)
Dept: OBSTETRICS AND GYNECOLOGY | Facility: CLINIC | Age: 19
End: 2024-11-12
Payer: MEDICAID

## 2024-11-12 VITALS
DIASTOLIC BLOOD PRESSURE: 86 MMHG | SYSTOLIC BLOOD PRESSURE: 130 MMHG | BODY MASS INDEX: 21.03 KG/M2 | HEART RATE: 104 BPM | WEIGHT: 126.38 LBS

## 2024-11-12 DIAGNOSIS — Z23 NEED FOR TDAP VACCINATION: ICD-10-CM

## 2024-11-12 DIAGNOSIS — O35.EXX0 ENCOUNTER FOR REPEAT ULTRASOUND OF FETAL PYELECTASIS IN SINGLETON PREGNANCY, ANTEPARTUM: ICD-10-CM

## 2024-11-12 DIAGNOSIS — Z3A.33 33 WEEKS GESTATION OF PREGNANCY: Primary | ICD-10-CM

## 2024-11-12 DIAGNOSIS — Z34.03 ENCOUNTER FOR SUPERVISION OF NORMAL FIRST PREGNANCY IN THIRD TRIMESTER: ICD-10-CM

## 2024-11-12 PROCEDURE — 99999 PR PBB SHADOW E&M-EST. PATIENT-LVL III: CPT | Mod: PBBFAC,,, | Performed by: OBSTETRICS & GYNECOLOGY

## 2024-11-12 PROCEDURE — 90715 TDAP VACCINE 7 YRS/> IM: CPT | Mod: PBBFAC | Performed by: OBSTETRICS & GYNECOLOGY

## 2024-11-12 PROCEDURE — 90471 IMMUNIZATION ADMIN: CPT | Mod: PBBFAC | Performed by: OBSTETRICS & GYNECOLOGY

## 2024-11-12 PROCEDURE — 99999PBSHW PR PBB SHADOW TECHNICAL ONLY FILED TO HB: Mod: PBBFAC,,,

## 2024-11-12 PROCEDURE — 59426 ANTEPARTUM CARE ONLY: CPT | Mod: S$PBB,TH,, | Performed by: OBSTETRICS & GYNECOLOGY

## 2024-11-12 PROCEDURE — 99213 OFFICE O/P EST LOW 20 MIN: CPT | Mod: PBBFAC | Performed by: OBSTETRICS & GYNECOLOGY

## 2024-11-12 RX ADMIN — TETANUS TOXOID, REDUCED DIPHTHERIA TOXOID AND ACELLULAR PERTUSSIS VACCINE, ADSORBED 0.5 ML: 5; 2.5; 8; 8; 2.5 SUSPENSION INTRAMUSCULAR at 02:11

## 2024-11-12 NOTE — PROGRESS NOTES
Return OB Visit    19 y.o. female  at 33w6d   She c/o nothing.  TDaP today.   Flu next time.  RSV next next time.  She saw MFM about pyelectasis--reassurance provided. She sees them for a f/u visit on .  Reports good fetal movement or fluttering. Denies any vaginal bleeding, leakage of fluid, cramping, contractions, or pressure.   Total weight gain/weight loss in pregnancy: 11.5 kg (25 lb 6.4 oz)     Vitals  BP: 130/86  Pulse: 104  Weight: 57.3 kg (126 lb 6.4 oz)  Prenatal  Fundal Height (cm): 31 cm  Fetal Heart Rate: 140  Movement: Present  Edema  LLE Edema: None  RLE Edema: None  Facial: None  Additional Edema?: No    Prenatal Labs:  Lab Results   Component Value Date    GROUPTRH A POS 2024    HGB 11.6 (L) 10/01/2024    HCT 34.9 (L) 10/01/2024     10/01/2024    SICKLE Negative 2024    HEPBSAG Non-Reactive 2024    [id number] Non-Reactive 10/01/2024    LABNGO Negative 2024    LABURIN  2024     Mixed Skin/Urogenital Tanya Isolated, no further workup.       A: 33w6d           ICD-10-CM ICD-9-CM    1. 33 weeks gestation of pregnancy  Z3A.33 V22.2 POCT URINALYSIS W/O SCOPE      2. Encounter for supervision of normal first pregnancy in third trimester  Z34.03 V22.0       3. Encounter for repeat ultrasound of fetal pyelectasis in carrera pregnancy, antepartum  O35.EXX0 655.83       4. Need for Tdap vaccination  Z23 V06.1 Tdap (BOOSTRIX) vaccine injection 0.5 mL          No pregnancy checklist tasks were completed during this visit, and no tasks are pending completion.    -Benefits of breastfeeding   -Rooming In and Skin to Skin    P: Bleeding, daily fetal kick counts, and  labor/labor precautions discussed.    Questions answered to desired level of satisfaction  Verbalized understanding to all information and instructions provided.  Follow up in about 2 weeks (around 2024) for CYNTHIA Garcia MD

## 2024-11-14 LAB
BILIRUB SERPL-MCNC: NORMAL MG/DL
BLOOD URINE, POC: NORMAL
CLARITY, UA: NORMAL
COLOR, UA: NORMAL
GLUCOSE UR QL STRIP: NORMAL
KETONES UR QL STRIP: NORMAL
LEUKOCYTE ESTERASE URINE, POC: 1
NITRITE, POC UA: NORMAL
PH, POC UA: 7.5
PROTEIN, POC: NORMAL
SPECIFIC GRAVITY, POC UA: 1.01
UROBILINOGEN, POC UA: NORMAL

## 2024-11-22 NOTE — PROGRESS NOTES
Return OB Visit    19 y.o. female  at 35w2d   She c/o increased swelling in her hands and feet. Denies any HA, RUQ pain, vision changes.  Reports good fetal movement or fluttering. Denies any vaginal bleeding, leakage of fluid, cramping, contractions, or pressure.   Total weight gain/weight loss in pregnancy: 13.2 kg (29 lb 3.2 oz)     Vitals  BP: (!) 140/89  Pulse: 103  Weight: 60.8 kg (134 lb)  Prenatal  Fundal Height (cm): 34 cm  Fetal Heart Rate: 145  Movement: Present  Edema  LLE Edema: Mild pitting, slight indentation  RLE Edema: Mild pitting, slight indentation  Facial: None  Additional Edema?: No    GBS performed.   Declines SVE.    Prenatal Labs:  Lab Results   Component Value Date    GROUPTRH A POS 2024    HGB 10.9 (L) 2024    HCT 33.9 (L) 2024     2024    SICKLE Negative 2024    HEPBSAG Non-Reactive 2024    [id number] Non-Reactive 10/01/2024    LABNGO Negative 2024    LABURIN Skin/Urogenital Tanya Isolated, no further workup. 2024       A: 35w2d           ICD-10-CM ICD-9-CM    1. 35 weeks gestation of pregnancy  Z3A.35 V22.2 POCT URINALYSIS W/O SCOPE      2. Need for RSV immunization  Z29.11 V04.82 RSV, preF A and preF B(PF) (Abrysvo) vaccine 120 mcg      3. Need for influenza vaccination  Z23 V04.81 influenza (Flulaval, Fluzone, Fluarix) 45 mcg/0.5 mL IM vaccine (> or = 6 mo) 0.5 mL      4. Elevated blood pressure affecting pregnancy in third trimester, antepartum  O16.3 642.33       5. Encounter for supervision of normal first pregnancy in third trimester  Z34.03 V22.0 Culture, Group B Strep          No pregnancy checklist tasks were completed during this visit, and no tasks are pending completion.    -Benefits of breastfeeding   -Rooming In and Skin to Skin    P: Bleeding, daily fetal kick counts, and  labor/labor precautions discussed.    Questions answered to desired level of satisfaction  Verbalized understanding to all information and  instructions provided.  Follow up in about 6 days (around 12/2/2024) for DIANNE .    To L&D for PIH work up due to new onset elevated BP.      Micky Garcia MD

## 2024-11-26 ENCOUNTER — HOSPITAL ENCOUNTER (EMERGENCY)
Facility: HOSPITAL | Age: 19
Discharge: HOME | End: 2024-11-26
Attending: OBSTETRICS & GYNECOLOGY
Payer: MEDICAID

## 2024-11-26 ENCOUNTER — ROUTINE PRENATAL (OUTPATIENT)
Dept: OBSTETRICS AND GYNECOLOGY | Facility: CLINIC | Age: 19
End: 2024-11-26
Payer: MEDICAID

## 2024-11-26 VITALS
BODY MASS INDEX: 22.3 KG/M2 | WEIGHT: 134 LBS | SYSTOLIC BLOOD PRESSURE: 140 MMHG | DIASTOLIC BLOOD PRESSURE: 89 MMHG | HEART RATE: 103 BPM

## 2024-11-26 VITALS
HEART RATE: 78 BPM | WEIGHT: 130.19 LBS | TEMPERATURE: 98 F | HEIGHT: 65 IN | BODY MASS INDEX: 21.69 KG/M2 | DIASTOLIC BLOOD PRESSURE: 69 MMHG | SYSTOLIC BLOOD PRESSURE: 135 MMHG

## 2024-11-26 DIAGNOSIS — R03.0 ELEVATED BLOOD PRESSURE READING: ICD-10-CM

## 2024-11-26 DIAGNOSIS — Z3A.35 35 WEEKS GESTATION OF PREGNANCY: Primary | ICD-10-CM

## 2024-11-26 DIAGNOSIS — O16.3 ELEVATED BLOOD PRESSURE AFFECTING PREGNANCY IN THIRD TRIMESTER, ANTEPARTUM: ICD-10-CM

## 2024-11-26 DIAGNOSIS — Z34.03 ENCOUNTER FOR SUPERVISION OF NORMAL FIRST PREGNANCY IN THIRD TRIMESTER: ICD-10-CM

## 2024-11-26 DIAGNOSIS — Z29.11 NEED FOR RSV IMMUNIZATION: ICD-10-CM

## 2024-11-26 DIAGNOSIS — Z23 NEED FOR INFLUENZA VACCINATION: ICD-10-CM

## 2024-11-26 LAB
ALBUMIN SERPL BCP-MCNC: 2.4 G/DL (ref 3.5–5)
ALBUMIN/GLOB SERPL: 0.8 {RATIO}
ALP SERPL-CCNC: 216 U/L (ref 40–150)
ALT SERPL W P-5'-P-CCNC: 10 U/L
ANION GAP SERPL CALCULATED.3IONS-SCNC: 9 MMOL/L (ref 7–16)
AST SERPL W P-5'-P-CCNC: 21 U/L (ref 5–34)
BACTERIA #/AREA URNS HPF: ABNORMAL /HPF
BASOPHILS # BLD AUTO: 0.03 K/UL (ref 0–0.2)
BASOPHILS NFR BLD AUTO: 0.3 % (ref 0–1)
BILIRUB SERPL-MCNC: 0.3 MG/DL
BILIRUB SERPL-MCNC: NORMAL MG/DL
BILIRUB UR QL STRIP: NEGATIVE
BLOOD URINE, POC: NORMAL
BUN SERPL-MCNC: 8 MG/DL (ref 7–19)
BUN/CREAT SERPL: 14 (ref 6–20)
BURR CELLS BLD QL SMEAR: ABNORMAL
CALCIUM SERPL-MCNC: 8.3 MG/DL (ref 8.4–10.2)
CHLORIDE SERPL-SCNC: 111 MMOL/L (ref 98–107)
CLARITY UR: ABNORMAL
CLARITY, UA: NORMAL
CO2 SERPL-SCNC: 22 MMOL/L (ref 22–29)
COLOR UR: ABNORMAL
COLOR, UA: NORMAL
CREAT SERPL-MCNC: 0.59 MG/DL (ref 0.55–1.02)
CREAT UR-MCNC: 70 MG/DL (ref 15–325)
DIFFERENTIAL METHOD BLD: ABNORMAL
EGFR (NO RACE VARIABLE) (RUSH/TITUS): 133 ML/MIN/1.73M2
EOSINOPHIL # BLD AUTO: 0 K/UL (ref 0–0.5)
EOSINOPHIL NFR BLD AUTO: 0 % (ref 1–4)
ERYTHROCYTE [DISTWIDTH] IN BLOOD BY AUTOMATED COUNT: 11.9 % (ref 11.5–14.5)
GLOBULIN SER-MCNC: 3.2 G/DL (ref 2–4)
GLUCOSE SERPL-MCNC: 78 MG/DL (ref 74–100)
GLUCOSE UR QL STRIP: NORMAL
GLUCOSE UR STRIP-MCNC: NORMAL MG/DL
HCT VFR BLD AUTO: 33 % (ref 38–47)
HGB BLD-MCNC: 10.5 G/DL (ref 12–16)
IMM GRANULOCYTES # BLD AUTO: 0.04 K/UL (ref 0–0.04)
IMM GRANULOCYTES NFR BLD: 0.4 % (ref 0–0.4)
KETONES UR QL STRIP: NORMAL
KETONES UR STRIP-SCNC: NEGATIVE MG/DL
LDH SERPL-CCNC: 186 U/L (ref 125–220)
LEUKOCYTE ESTERASE UR QL STRIP: ABNORMAL
LEUKOCYTE ESTERASE URINE, POC: NORMAL
LYMPHOCYTES # BLD AUTO: 1.93 K/UL (ref 1–4.8)
LYMPHOCYTES NFR BLD AUTO: 21.2 % (ref 27–41)
MCH RBC QN AUTO: 28.2 PG (ref 27–31)
MCHC RBC AUTO-ENTMCNC: 31.8 G/DL (ref 32–36)
MCV RBC AUTO: 88.5 FL (ref 80–96)
MONOCYTES # BLD AUTO: 0.69 K/UL (ref 0–0.8)
MONOCYTES NFR BLD AUTO: 7.6 % (ref 2–6)
MPC BLD CALC-MCNC: 12.1 FL (ref 9.4–12.4)
MUCOUS, UA: ABNORMAL /LPF
NEUTROPHILS # BLD AUTO: 6.4 K/UL (ref 1.8–7.7)
NEUTROPHILS NFR BLD AUTO: 70.5 % (ref 53–65)
NITRITE UR QL STRIP: NEGATIVE
NITRITE, POC UA: NORMAL
NRBC # BLD AUTO: 0 X10E3/UL
NRBC, AUTO (.00): 0 %
PH UR STRIP: 6.5 PH UNITS
PH, POC UA: 6.5
PLATELET # BLD AUTO: 194 K/UL (ref 150–400)
PLATELET MORPHOLOGY: ABNORMAL
POTASSIUM SERPL-SCNC: 4 MMOL/L (ref 3.5–5.1)
PROT SERPL-MCNC: 5.6 G/DL (ref 6.4–8.3)
PROT UR QL STRIP: 10
PROT UR-MCNC: 15 MG/DL
PROT/CREAT 24H UR-RTO: 0.21 (ref 0–0.2)
PROTEIN, POC: NORMAL
RBC # BLD AUTO: 3.73 M/UL (ref 4.2–5.4)
RBC # UR STRIP: NEGATIVE /UL
RBC #/AREA URNS HPF: 1 /HPF
SODIUM SERPL-SCNC: 138 MMOL/L (ref 136–145)
SP GR UR STRIP: 1.01
SPECIFIC GRAVITY, POC UA: 1.01
SQUAMOUS #/AREA URNS LPF: ABNORMAL /HPF
UROBILINOGEN UR STRIP-ACNC: NORMAL MG/DL
UROBILINOGEN, POC UA: 0.2
WBC # BLD AUTO: 9.09 K/UL (ref 4.5–11)
WBC #/AREA URNS HPF: 13 /HPF

## 2024-11-26 PROCEDURE — 99999PBSHW PR PBB SHADOW TECHNICAL ONLY FILED TO HB: Mod: PBBFAC,,,

## 2024-11-26 PROCEDURE — 99284 EMERGENCY DEPT VISIT MOD MDM: CPT

## 2024-11-26 PROCEDURE — 87653 STREP B DNA AMP PROBE: CPT | Mod: ,,, | Performed by: CLINICAL MEDICAL LABORATORY

## 2024-11-26 PROCEDURE — 90678 RSV VACC PREF BIVALENT IM: CPT | Mod: PBBFAC | Performed by: OBSTETRICS & GYNECOLOGY

## 2024-11-26 PROCEDURE — 85025 COMPLETE CBC W/AUTO DIFF WBC: CPT | Performed by: OBSTETRICS & GYNECOLOGY

## 2024-11-26 PROCEDURE — 81003 URINALYSIS AUTO W/O SCOPE: CPT | Performed by: OBSTETRICS & GYNECOLOGY

## 2024-11-26 PROCEDURE — 99999 PR PBB SHADOW E&M-EST. PATIENT-LVL III: CPT | Mod: PBBFAC,,, | Performed by: OBSTETRICS & GYNECOLOGY

## 2024-11-26 PROCEDURE — 99213 OFFICE O/P EST LOW 20 MIN: CPT | Mod: PBBFAC | Performed by: OBSTETRICS & GYNECOLOGY

## 2024-11-26 PROCEDURE — 83615 LACTATE (LD) (LDH) ENZYME: CPT | Performed by: OBSTETRICS & GYNECOLOGY

## 2024-11-26 PROCEDURE — 82570 ASSAY OF URINE CREATININE: CPT | Performed by: OBSTETRICS & GYNECOLOGY

## 2024-11-26 PROCEDURE — 80053 COMPREHEN METABOLIC PANEL: CPT | Performed by: OBSTETRICS & GYNECOLOGY

## 2024-11-26 PROCEDURE — 59426 ANTEPARTUM CARE ONLY: CPT | Mod: S$PBB,TH,, | Performed by: OBSTETRICS & GYNECOLOGY

## 2024-11-26 PROCEDURE — 90472 IMMUNIZATION ADMIN EACH ADD: CPT | Mod: PBBFAC | Performed by: OBSTETRICS & GYNECOLOGY

## 2024-11-26 PROCEDURE — 36415 COLL VENOUS BLD VENIPUNCTURE: CPT | Performed by: OBSTETRICS & GYNECOLOGY

## 2024-11-26 PROCEDURE — 87086 URINE CULTURE/COLONY COUNT: CPT | Performed by: OBSTETRICS & GYNECOLOGY

## 2024-11-26 PROCEDURE — 90656 IIV3 VACC NO PRSV 0.5 ML IM: CPT | Mod: PBBFAC | Performed by: OBSTETRICS & GYNECOLOGY

## 2024-11-26 PROCEDURE — 90471 IMMUNIZATION ADMIN: CPT | Mod: PBBFAC | Performed by: OBSTETRICS & GYNECOLOGY

## 2024-11-26 RX ADMIN — Medication 120 MCG: at 12:11

## 2024-11-26 RX ADMIN — INFLUENZA VIRUS VACCINE 0.5 ML: 15; 15; 15 SUSPENSION INTRAMUSCULAR at 12:11

## 2024-11-26 NOTE — ED NOTES
Discharge instructions reviewed and copy given. Instructed on fetal kick counts, s/s of labor, hypertension in pregnancy. Voiced understanding. Discharge per ambulatory in stable condition.

## 2024-11-26 NOTE — ED NOTES
Refer to [facility] sent over from Dr. Garcia's office for evaluation of elevated blood pressure. Stable condition. Connected to EFM. Plan of care discussed with positive feedback. Oriented to room. Nst initiated. Procedure explained. Voiced understanding.

## 2024-11-26 NOTE — ED PROVIDER NOTES
Encounter Date: 2024    [facility] Physician: Kristin Sher   Primary OBGYN:Dr. Garcia    Admit Diagnosis/Chief Complaint:  ecclampsia work up.  History     Chief Complaint   Patient presents with    Hypertension     18 Y/O  @ 35+6 weeks, JANETT 2024, sent over from the office by Dr. Garcia for pre-ecclampsia work-up. Noted to have elevated BP's in the office but normalized here in OB ED. She denies RUQ pains or headache. Mild pedal edema that resolves after she elevates her feet. Denies feeling contractions, LOF, vaginal bleeding. Labs drawn.        Obstetric HPI:  Patient reports None contractions, active fetal movement, absent vaginal bleeding , absent loss of fluid      Objective:     Vital Signs (Most Recent):  Temp: 97.5 °F (36.4 °C) (24 1212)  Pulse: 78 (24 1342)  BP: 135/69 (24 1342) Vital Signs (24h Range):  Temp:  [97.5 °F (36.4 °C)] 97.5 °F (36.4 °C)  Pulse:  [] 78  BP: (127-141)/(67-89) 135/69     Weight: 59.1 kg (130 lb 3.2 oz)  Body mass index is 21.67 kg/m².    FHT: 130 Cat 1 (reassuring)  TOCO:  Irregular contractions    No intake or output data in the 24 hours ending 24 1430    Cervical Exam: NE  Dilation:    Effacement:    Station:   Presentation:      Significant Labs:  Recent Lab Results         24  1350   24  1328   24  1245   24  1241        Bilirubin, POC -             Spec Grav UA 1.010             pH, UA 6.5             Protein, POC -             Urobilinogen, UA 0.2             Nitrite, UA -             Albumin/Globulin Ratio   0.8           Albumin   2.4           ALP   216           ALT   10           Anion Gap   9           Appearance, UA       Turbid       AST   21           Bacteria, UA       Occasional       Baso #     0.03         Basophil %     0.3         Bilirubin (UA)       Negative       BILIRUBIN TOTAL   0.3           BUN   8           BUN/CREAT RATIO   14           [place]/Echinocytes     Few         Calcium   8.3            Chloride   111           Clarity, UA, POC             CO2   22           Color, UA       Light-Yellow       Color, UA POC             Creatinine   0.59           Urine Creatinine       70       Differential Method     Scan Smear         eGFR   133           Eos #     0.00         Eos %     0.0         Globulin, Total   3.2           Glucose   78           Glucose, UA -             Glucose, UA       Normal       Hematocrit     33.0         Hemoglobin     10.5         Immature Grans (Abs)     0.04         Immature Granulocytes     0.4         Ketones, UA -             Ketones, UA       Negative       Lactate Dehydrogenase   186           Leukocyte Esterase, UA       Moderate       Lymph #     1.93         Lymph %     21.2         MCH     28.2         MCHC     31.8         MCV     88.5         Mono #     0.69         Mono %     7.6         MPV     12.1         Mucous       Occasional       Neutrophils, Abs     6.40         Neutrophils Relative     70.5         NITRITE UA       Negative       nRBC     0.0         NUCLEATED RBC ABSOLUTE     0.00         Blood, UA       Negative       pH, UA       6.5       PLATELET MORPHOLOGY     Large Platelets         Platelet Count     194         Potassium   4.0           PROTEIN TOTAL   5.6           Protein, UA       10       Urine Protein       15.0       Protein/Creat Ratio       0.214       RBC     3.73         Blood, UA -             RBC, UA       1       RDW     11.9         Sodium   138           Spec Grav UA       1.015       Squamous Epithelial Cells, UA       Many       UROBILINOGEN UA       Normal       WBC, UA -             WBC, UA       13       WBC     9.09               Review of patient's allergies indicates:   Allergen Reactions    Fire ant      ants    Venom-honey bee Edema    Wasp venom      No past medical history on file.  Past Surgical History:   Procedure Laterality Date    FRACTURE SURGERY Left 2009    Repair of left pinky finger     Family History    Problem Relation Name Age of Onset    Cancer Paternal Aunt      Cancer Maternal Grandmother      Cancer Maternal Grandfather      Cancer Paternal Grandmother      No Known Problems Mother      No Known Problems Father      No Known Problems Sister      No Known Problems Brother      No Known Problems Sister       Social History     Tobacco Use    Smoking status: Every Day     Types: Vaping with nicotine     Passive exposure: Current    Smokeless tobacco: Never    Tobacco comments:     Former cigarettes   Substance Use Topics    Alcohol use: Never    Drug use: Never     Review of Systems   Constitutional: Negative.    HENT: Negative.     Respiratory: Negative.     Cardiovascular: Negative.    Gastrointestinal: Negative.  Negative for abdominal pain.   Genitourinary: Negative.  Negative for vaginal bleeding.   Neurological: Negative.  Negative for headaches.   Psychiatric/Behavioral: Negative.         Physical Exam     Initial Vitals [11/26/24 1212]   BP Pulse Resp Temp SpO2   (!) 141/89 88 -- 97.5 °F (36.4 °C) --      MAP       --         Physical Exam    Constitutional: She appears well-developed and well-nourished. No distress.   HENT:   Head: Normocephalic and atraumatic.   Neck:   Normal range of motion.  Cardiovascular:  Normal rate and regular rhythm.           Pulmonary/Chest: No respiratory distress. She has no wheezes.   Abdominal: Abdomen is soft. She exhibits no distension. There is no abdominal tenderness. There is no rebound.   Musculoskeletal:         General: No tenderness or edema.      Cervical back: Normal range of motion.     Neurological: She is alert and oriented to person, place, and time.         ED Course     Labs Reviewed   COMPREHENSIVE METABOLIC PANEL - Abnormal       Result Value    Sodium 138      Potassium 4.0      Chloride 111 (*)     CO2 22      Anion Gap 9      Glucose 78      BUN 8      Creatinine 0.59      BUN/Creatinine Ratio 14      Calcium 8.3 (*)     Total Protein 5.6 (*)      Albumin 2.4 (*)     Globulin 3.2      A/G Ratio 0.8      Bilirubin, Total 0.3      Alk Phos 216 (*)     ALT 10      AST 21      eGFR 133     PROTEIN / CREATININE RATIO, URINE - Abnormal    Creatinine, Urine 70      Protein, Urine 15.0      Protein/Creatinine Ratio 0.214 (*)    URINALYSIS - Abnormal    Color, UA Light-Yellow      Clarity, UA Turbid      pH, UA 6.5      Leukocytes, UA Moderate (*)     Nitrites, UA Negative      Protein, UA 10 (*)     Glucose, UA Normal      Ketones, UA Negative      Urobilinogen, UA Normal      Bilirubin, UA Negative      Blood, UA Negative      Specific Gravity, UA 1.015     CBC WITH DIFFERENTIAL - Abnormal    WBC 9.09      RBC 3.73 (*)     Hemoglobin 10.5 (*)     Hematocrit 33.0 (*)     MCV 88.5      MCH 28.2      MCHC 31.8 (*)     RDW 11.9      Platelet Count 194      MPV 12.1      Neutrophils % 70.5 (*)     Lymphocytes % 21.2 (*)     Monocytes % 7.6 (*)     Eosinophils % 0.0 (*)     Basophils % 0.3      Immature Granulocytes % 0.4      nRBC, Auto 0.0      Neutrophils, Abs 6.40      Lymphocytes, Absolute 1.93      Monocytes, Absolute 0.69      Eosinophils, Absolute 0.00      Basophils, Absolute 0.03      Immature Granulocytes, Absolute 0.04      nRBC, Absolute 0.00      Diff Type Scan Smear     URINALYSIS, MICROSCOPIC - Abnormal    WBC, UA 13 (*)     RBC, UA 1      Bacteria, UA Occasional (*)     Squamous Epithelial Cells, UA Many (*)     Mucous Occasional (*)    CBC MORPHOLOGY - Abnormal    Platelet Morphology Large Platelets (*)     [place]/Echinocytes Few     LACTATE DEHYDROGENASE - Normal         CULTURE, URINE   CBC W/ AUTO DIFFERENTIAL    Narrative:     The following orders were created for panel order CBC auto differential.  Procedure                               Abnormality         Status                     ---------                               -----------         ------                     CBC with Differential[[id number]]       Abnormal            Final result                  Please view results for these tests on the individual orders.   EXTRA TUBES    Narrative:     The following orders were created for panel order EXTRA TUBES.  Procedure                               Abnormality         Status                     ---------                               -----------         ------                     Lavender Top Hold[[id number]]                               In process                   Please view results for these tests on the individual orders.   LAVENDER TOP HOLD        Imaging Results    None          Medical Decision Making  35+6 weeks.   Pre-ecclampsia work up negative.  Labs reviewed.  Cath 1 NST  Not in labor    Amount and/or Complexity of Data Reviewed  Labs: ordered.       Medications - No data to display                           Clinical Impression:   Final diagnoses:  [Z3A.35] 35 weeks gestation of pregnancy (Primary)  [R03.0] Elevated blood pressure reading        ED Disposition Condition    Discharge Stable          ED Prescriptions    None       Follow-up Information       Follow up With Specialties Details Why Contact Info    Micky Garcia MD Obstetrics and Gynecology In 6 days  [address] [facility] [address]  [phone]               Kristin Sher MD  11/26/24 [id number]

## 2024-11-26 NOTE — ED NOTES
Dr. Sher here. Reviewed labs and blood pressures. Discharged with discharge instructions given. To keep next scheduled appointment. Questions answered.

## 2024-11-28 LAB
CULTURE, GROUP B STREP: NORMAL
UA COMPLETE W REFLEX CULTURE PNL UR: NORMAL

## 2024-12-02 ENCOUNTER — ROUTINE PRENATAL (OUTPATIENT)
Dept: OBSTETRICS AND GYNECOLOGY | Facility: CLINIC | Age: 19
End: 2024-12-02
Payer: MEDICAID

## 2024-12-02 VITALS
HEART RATE: 97 BPM | BODY MASS INDEX: 21.77 KG/M2 | SYSTOLIC BLOOD PRESSURE: 145 MMHG | DIASTOLIC BLOOD PRESSURE: 95 MMHG | WEIGHT: 130.81 LBS

## 2024-12-02 DIAGNOSIS — Z3A.36 36 WEEKS GESTATION OF PREGNANCY: Primary | ICD-10-CM

## 2024-12-02 DIAGNOSIS — O13.9 GESTATIONAL HYPERTENSION: ICD-10-CM

## 2024-12-02 DIAGNOSIS — O13.3 GESTATIONAL HYPERTENSION, THIRD TRIMESTER: Primary | ICD-10-CM

## 2024-12-02 DIAGNOSIS — O13.3 GESTATIONAL HYPERTENSION, THIRD TRIMESTER: ICD-10-CM

## 2024-12-02 PROCEDURE — 99999 PR PBB SHADOW E&M-EST. PATIENT-LVL III: CPT | Mod: PBBFAC,,, | Performed by: OBSTETRICS & GYNECOLOGY

## 2024-12-02 PROCEDURE — 59426 ANTEPARTUM CARE ONLY: CPT | Mod: S$PBB,TH,, | Performed by: OBSTETRICS & GYNECOLOGY

## 2024-12-02 PROCEDURE — 99213 OFFICE O/P EST LOW 20 MIN: CPT | Mod: PBBFAC | Performed by: OBSTETRICS & GYNECOLOGY

## 2024-12-02 RX ORDER — OXYTOCIN 10 [USP'U]/ML
10 INJECTION, SOLUTION INTRAMUSCULAR; INTRAVENOUS ONCE AS NEEDED
OUTPATIENT
Start: 2024-12-02 | End: 2036-04-30

## 2024-12-02 RX ORDER — OXYTOCIN/0.9 % SODIUM CHLORIDE 15/250 ML
95 PLASTIC BAG, INJECTION (ML) INTRAVENOUS CONTINUOUS PRN
OUTPATIENT
Start: 2024-12-02

## 2024-12-02 RX ORDER — CARBOPROST TROMETHAMINE 250 UG/ML
250 INJECTION, SOLUTION INTRAMUSCULAR
OUTPATIENT
Start: 2024-12-02

## 2024-12-02 RX ORDER — MISOPROSTOL 200 UG/1
800 TABLET ORAL ONCE AS NEEDED
OUTPATIENT
Start: 2024-12-02 | End: 2036-04-30

## 2024-12-02 RX ORDER — METOCLOPRAMIDE HYDROCHLORIDE 5 MG/ML
5 INJECTION INTRAMUSCULAR; INTRAVENOUS EVERY 6 HOURS PRN
OUTPATIENT
Start: 2024-12-02

## 2024-12-02 RX ORDER — OXYTOCIN/0.9 % SODIUM CHLORIDE 15/250 ML
95 PLASTIC BAG, INJECTION (ML) INTRAVENOUS ONCE AS NEEDED
OUTPATIENT
Start: 2024-12-02 | End: 2036-04-30

## 2024-12-02 RX ORDER — MISOPROSTOL 100 MCG
25 TABLET ORAL EVERY 4 HOURS PRN
OUTPATIENT
Start: 2024-12-02

## 2024-12-02 RX ORDER — OXYTOCIN-SODIUM CHLORIDE 0.9% IV SOLN 30 UNIT/500ML 30-0.9/5 UT/ML-%
30 SOLUTION INTRAVENOUS ONCE AS NEEDED
OUTPATIENT
Start: 2024-12-02 | End: 2036-04-30

## 2024-12-02 RX ORDER — SODIUM CHLORIDE 9 MG/ML
INJECTION, SOLUTION INTRAVENOUS
OUTPATIENT
Start: 2024-12-02

## 2024-12-02 RX ORDER — TRANEXAMIC ACID 10 MG/ML
1000 INJECTION, SOLUTION INTRAVENOUS EVERY 30 MIN PRN
OUTPATIENT
Start: 2024-12-02

## 2024-12-02 RX ORDER — CALCIUM CARBONATE 200(500)MG
500 TABLET,CHEWABLE ORAL 3 TIMES DAILY PRN
OUTPATIENT
Start: 2024-12-02

## 2024-12-02 RX ORDER — ONDANSETRON 4 MG/1
8 TABLET, ORALLY DISINTEGRATING ORAL EVERY 8 HOURS PRN
OUTPATIENT
Start: 2024-12-02

## 2024-12-02 RX ORDER — METHYLERGONOVINE MALEATE 0.2 MG/ML
200 INJECTION INTRAVENOUS ONCE AS NEEDED
OUTPATIENT
Start: 2024-12-02 | End: 2036-04-30

## 2024-12-02 RX ORDER — MUPIROCIN 20 MG/G
OINTMENT TOPICAL
OUTPATIENT
Start: 2024-12-02

## 2024-12-02 RX ORDER — CEFAZOLIN SODIUM 2 G/50ML
2 SOLUTION INTRAVENOUS ONCE AS NEEDED
OUTPATIENT
Start: 2024-12-02 | End: 2036-04-30

## 2024-12-02 RX ORDER — LIDOCAINE HYDROCHLORIDE 10 MG/ML
10 INJECTION, SOLUTION INFILTRATION; PERINEURAL ONCE AS NEEDED
OUTPATIENT
Start: 2024-12-02 | End: 2036-04-30

## 2024-12-02 RX ORDER — DIPHENOXYLATE HYDROCHLORIDE AND ATROPINE SULFATE 2.5; .025 MG/1; MG/1
2 TABLET ORAL EVERY 6 HOURS PRN
OUTPATIENT
Start: 2024-12-02

## 2024-12-02 RX ORDER — BUTORPHANOL TARTRATE 2 MG/ML
2 INJECTION INTRAMUSCULAR; INTRAVENOUS
OUTPATIENT
Start: 2024-12-02

## 2024-12-02 RX ORDER — MISOPROSTOL 200 UG/1
800 TABLET ORAL ONCE AS NEEDED
OUTPATIENT
Start: 2024-12-02

## 2024-12-02 RX ORDER — OXYTOCIN-SODIUM CHLORIDE 0.9% IV SOLN 30 UNIT/500ML 30-0.9/5 UT/ML-%
10 SOLUTION INTRAVENOUS ONCE AS NEEDED
OUTPATIENT
Start: 2024-12-02 | End: 2036-04-30

## 2024-12-02 RX ORDER — SODIUM CHLORIDE, SODIUM LACTATE, POTASSIUM CHLORIDE, CALCIUM CHLORIDE 600; 310; 30; 20 MG/100ML; MG/100ML; MG/100ML; MG/100ML
INJECTION, SOLUTION INTRAVENOUS CONTINUOUS
OUTPATIENT
Start: 2024-12-02

## 2024-12-02 RX ORDER — BUTORPHANOL TARTRATE 1 MG/ML
1 INJECTION INTRAMUSCULAR; INTRAVENOUS
OUTPATIENT
Start: 2024-12-02

## 2024-12-02 RX ORDER — ACETAMINOPHEN 325 MG/1
650 TABLET ORAL EVERY 6 HOURS PRN
OUTPATIENT
Start: 2024-12-02

## 2024-12-02 RX ORDER — OXYTOCIN/0.9 % SODIUM CHLORIDE 15/250 ML
0-32 PLASTIC BAG, INJECTION (ML) INTRAVENOUS CONTINUOUS
OUTPATIENT
Start: 2024-12-02

## 2024-12-02 RX ORDER — TERBUTALINE SULFATE 1 MG/ML
0.25 INJECTION SUBCUTANEOUS
OUTPATIENT
Start: 2024-12-02

## 2024-12-02 RX ORDER — SIMETHICONE 80 MG
1 TABLET,CHEWABLE ORAL 4 TIMES DAILY PRN
OUTPATIENT
Start: 2024-12-02

## 2024-12-02 NOTE — PROGRESS NOTES
Return OB Visit    19 y.o. female  at 36w5d   She c/o nothing. Good FM. No HA, RUQ pain, vision changes.  MFM apt on . Agrees to IOL on  at 0500. Will schedule.   PIH labs today.  Reports good fetal movement or fluttering. Denies any vaginal bleeding, leakage of fluid, cramping, contractions, or pressure.   Total weight gain/weight loss in pregnancy: 13.5 kg (29 lb 12.8 oz)     Vitals  BP: (!) 145/95  Pulse: 97  Weight: 59.3 kg (130 lb 12.8 oz)  Prenatal  Fundal Height (cm): 34 cm  Fetal Heart Rate: 150  Movement: Present  Edema  LLE Edema: None  RLE Edema: None  Facial: None  Additional Edema?: No    Prenatal Labs:  Lab Results   Component Value Date    GROUPTRH A POS 2024    HGB 10.9 (L) 2024    HCT 33.9 (L) 2024     2024    SICKLE Negative 2024    HEPBSAG Non-Reactive 2024    [id number] Non-Reactive 10/01/2024    LABNGO Negative 2024    LABURIN Skin/Urogenital Tanya Isolated, no further workup. 2024       A: 36w5d           ICD-10-CM ICD-9-CM    1. 36 weeks gestation of pregnancy  Z3A.36 V22.2 POCT URINALYSIS W/O SCOPE      2. Gestational hypertension, third trimester  O13.3 642.33 POCT URINALYSIS W/O SCOPE      CBC Auto Differential      Comprehensive Metabolic Panel      Lactate Dehydrogenase      Uric Acid      IP OB Labor Induction      Admit to Inpatient      Full code      Vital Signs      Vital signs- Early Labor(0-4 cm)      Vital Signs- Active Labor (4-10 cm)      Vital Signs Post Delivery      Check Temperature, Membranes Intact      Check Temperature, Membranes Ruptured      Pulse Oximetry Continous while CONTINUOUS electronic fetal Monitor in use      Cervical Exam      Fetal / Uterine Monitoring - Continuous      Fetal monitoring - scalp electrode      Insert peripheral IV      Lambert to [place]      Lambert Catheter Care every 12 hours      Nurse to discontinue lambert when patient no longer meets criteria      Post Lambert Catheter Removal  Protocol      Perform Bladder scan      Perform Intermittent Catheterization      Perform Bladder Scan, post intermittent cath      Place indwelling catheter      Watch for excessive vaginal bleeding      Chlorhexidine Bath      Decrease Oxytocin      Discontinue oxytocin      Oxytocin Titration Resumption      Amnioinfusion PRN recurrent variable decelerations      Administer a 500 -1000 ml IV fluid bolus as needed for      Assess fundal height/uterine firmness, lochia, perineum      Apply ice to perineum, immediate post-delivery      Check Temperature Post Delivery      Notify Physician      Notify OB care provider      Notify physician of cervical change or lack of change      Notify physician for excessive uterine activity      Notify physician for Fetal Heart Tones consistent with Category II or Category III tracing      Notify Pediatrician after delivery      Notify Nursery / Level II Nursery      Notify Nursery / Level II Nursery      Abdominal prep for  Section      Chlorhexidine (CHG) 2% Wipes      Reason for no VTE Prophylaxis      Chlorohexidine Gluconate Bath      Vital signs every 15 minutes      Vital signs every 15 minutes      miSOPROStoL tablet 800 mcg      miSOPROStoL tablet 800 mcg      CBC with Auto Differential      Comprehensive metabolic panel      Type & Screen      Syphilis Antibody (RPR)      HIV 1/2 Ag/Ab (4th Gen)      Hepatitis B surface antigen      POCT Cord Blood Gas Umbilical Artery Cord Gas      POCT Cord Blood Gas Umbilical Vein Cord Gas      Inpatient consult to Anesthesiology      Diet Adult Regular      Saline lock IV      Perform baseline 20 minute non stress test - proceed with other orders if reactive criteria met.      miSOPROStol split tablet 25 mcg      Maternal vital signs, fetal and uterine assessments after each dose and hourly      Electronic fetal monitoring      May ambulate 30 minutes after insertion of Misoprostol (Cytotec)      Withhold Misoprostol  (Cytotec) dosing:      Oxytocin should be delayed until at least 4 hours after the last Misoprostol (Cytotec) dose administered      Decrease Oxytocin by 2 mu/min PRN for:      Decrease the oxytocin by 50% if the FHR tracing shows no improvement or there is no resolution of tachysystole within 30 minutes or if the FHR tracing worsens despite the reduction by 2mU/min      Discontinue the oxytocin infusion if the non-reassuring FHR tracing or tachysystole do not resolve within 30 minutes or worsens despite the 50% reduction in the oxytocin rate      For tachysystole :      Decrease Oxytocin by 2 mu/min PRN for tachysystole that persists after lateral position change and (if not contraindicated) an IV fluid bolus      Decrease Oxytocin by 50% if tachysystole persists after decreasing by 2 mu/min after 30 minutes.      Discontinue Oxytocin PRN if tachysystole persists after decreasing Oxytocin by 50% after 30 minutes.      Resume Oxytocin when uterine activity is appropriate and fetal oxygenation is demonstrated.      Notify OB      Urinalysis      Lactate Dehydrogenase      Uric Acid      Protein/Creatinine Ratio, Urine      CANCELED: Protein/Creatinine Ratio, Urine      3. Gestational hypertension  O13.9 642.30           No pregnancy checklist tasks were completed during this visit, and no tasks are pending completion.    -Benefits of breastfeeding   -Rooming In and Skin to Skin    P: Bleeding, daily fetal kick counts, and  labor/labor precautions discussed.    Questions answered to desired level of satisfaction  Verbalized understanding to all information and instructions provided.  Follow up in about 1 week (around 2024) for DIANNE.  PIH precautions discussed.  Will f/u on labs today.    Micky Garcia MD

## 2024-12-04 ENCOUNTER — PATIENT MESSAGE (OUTPATIENT)
Dept: OBSTETRICS AND GYNECOLOGY | Facility: CLINIC | Age: 19
End: 2024-12-04
Payer: MEDICAID

## 2024-12-04 ENCOUNTER — TELEPHONE (OUTPATIENT)
Dept: OBSTETRICS AND GYNECOLOGY | Facility: CLINIC | Age: 19
End: 2024-12-04
Payer: MEDICAID

## 2024-12-04 NOTE — TELEPHONE ENCOUNTER
Spoke with pt about results and labs next week; pt voiced understanding and knows I may call back with urine lab that just resulted.       ----- Message from Micky Garcia MD sent at 12/2/2024  7:23 PM CST -----  Please tell her that so far her labs are wnl except her LDH is slightly elevated. We will repeat her labs next week. Also, please make sure she does the spot Pr/Cr this week.

## 2024-12-09 ENCOUNTER — HOSPITAL ENCOUNTER (INPATIENT)
Facility: HOSPITAL | Age: 19
LOS: 4 days | Discharge: HOME | End: 2024-12-13
Attending: OBSTETRICS & GYNECOLOGY | Admitting: OBSTETRICS & GYNECOLOGY
Payer: MEDICAID

## 2024-12-09 ENCOUNTER — HOSPITAL ENCOUNTER (OUTPATIENT)
Dept: OBSTETRICS AND GYNECOLOGY | Facility: CLINIC | Age: 19
Discharge: HOME | End: 2024-12-09
Attending: OBSTETRICS & GYNECOLOGY
Payer: MEDICAID

## 2024-12-09 ENCOUNTER — ROUTINE PRENATAL (OUTPATIENT)
Dept: OBSTETRICS AND GYNECOLOGY | Facility: CLINIC | Age: 19
End: 2024-12-09
Payer: MEDICAID

## 2024-12-09 VITALS
HEART RATE: 92 BPM | WEIGHT: 136 LBS | BODY MASS INDEX: 22.63 KG/M2 | DIASTOLIC BLOOD PRESSURE: 102 MMHG | SYSTOLIC BLOOD PRESSURE: 141 MMHG

## 2024-12-09 DIAGNOSIS — O14.03 MILD PRE-ECLAMPSIA IN THIRD TRIMESTER: Primary | ICD-10-CM

## 2024-12-09 DIAGNOSIS — O13.3 GESTATIONAL HYPERTENSION, THIRD TRIMESTER: ICD-10-CM

## 2024-12-09 DIAGNOSIS — Z3A.37 37 WEEKS GESTATION OF PREGNANCY: Primary | ICD-10-CM

## 2024-12-09 DIAGNOSIS — O13.3 GESTATIONAL HYPERTENSION, THIRD TRIMESTER: Primary | ICD-10-CM

## 2024-12-09 DIAGNOSIS — O13.9 GESTATIONAL HYPERTENSION: ICD-10-CM

## 2024-12-09 PROBLEM — O35.EXX0 ENCOUNTER FOR REPEAT ULTRASOUND OF FETAL PYELECTASIS, ANTEPARTUM: Status: ACTIVE | Noted: 2024-12-09

## 2024-12-09 LAB
ALBUMIN SERPL BCP-MCNC: 2.5 G/DL (ref 3.5–5)
ALBUMIN/GLOB SERPL: 0.7 {RATIO}
ALP SERPL-CCNC: 255 U/L (ref 40–150)
ALT SERPL W P-5'-P-CCNC: 12 U/L
ANION GAP SERPL CALCULATED.3IONS-SCNC: 13 MMOL/L (ref 7–16)
AST SERPL W P-5'-P-CCNC: 36 U/L (ref 5–34)
BACTERIA #/AREA URNS HPF: ABNORMAL /HPF
BASOPHILS # BLD AUTO: 0.03 K/UL (ref 0–0.2)
BASOPHILS NFR BLD AUTO: 0.3 % (ref 0–1)
BILIRUB SERPL-MCNC: 0.3 MG/DL
BILIRUB UR QL STRIP: NEGATIVE
BUN SERPL-MCNC: 9 MG/DL (ref 7–19)
BUN/CREAT SERPL: 13 (ref 6–20)
CALCIUM SERPL-MCNC: 8.4 MG/DL (ref 8.4–10.2)
CHLORIDE SERPL-SCNC: 106 MMOL/L (ref 98–107)
CLARITY UR: ABNORMAL
CO2 SERPL-SCNC: 19 MMOL/L (ref 22–29)
COLOR UR: ABNORMAL
CREAT SERPL-MCNC: 0.67 MG/DL (ref 0.55–1.02)
CREAT UR-MCNC: 119 MG/DL (ref 15–325)
DIFFERENTIAL METHOD BLD: ABNORMAL
EGFR (NO RACE VARIABLE) (RUSH/TITUS): 129 ML/MIN/1.73M2
EOSINOPHIL # BLD AUTO: 0 K/UL (ref 0–0.5)
EOSINOPHIL NFR BLD AUTO: 0 % (ref 1–4)
ERYTHROCYTE [DISTWIDTH] IN BLOOD BY AUTOMATED COUNT: 12.3 % (ref 11.5–14.5)
GLOBULIN SER-MCNC: 3.6 G/DL (ref 2–4)
GLUCOSE SERPL-MCNC: 71 MG/DL (ref 74–100)
GLUCOSE UR STRIP-MCNC: NORMAL MG/DL
HBV SURFACE AG SERPL QL IA: NORMAL
HCT VFR BLD AUTO: 34.6 % (ref 38–47)
HGB BLD-MCNC: 11.1 G/DL (ref 12–16)
HIV 1+O+2 AB SERPL QL: NORMAL
IMM GRANULOCYTES # BLD AUTO: 0.03 K/UL (ref 0–0.04)
IMM GRANULOCYTES NFR BLD: 0.3 % (ref 0–0.4)
INDIRECT COOMBS: NORMAL
KETONES UR STRIP-SCNC: NEGATIVE MG/DL
LDH SERPL-CCNC: 204 U/L (ref 125–220)
LEUKOCYTE ESTERASE UR QL STRIP: ABNORMAL
LYMPHOCYTES # BLD AUTO: 2.42 K/UL (ref 1–4.8)
LYMPHOCYTES NFR BLD AUTO: 26 % (ref 27–41)
MCH RBC QN AUTO: 27.8 PG (ref 27–31)
MCHC RBC AUTO-ENTMCNC: 32.1 G/DL (ref 32–36)
MCV RBC AUTO: 86.7 FL (ref 80–96)
MONOCYTES # BLD AUTO: 0.78 K/UL (ref 0–0.8)
MONOCYTES NFR BLD AUTO: 8.4 % (ref 2–6)
MPC BLD CALC-MCNC: 11.5 FL (ref 9.4–12.4)
MUCOUS, UA: ABNORMAL /LPF
NEUTROPHILS # BLD AUTO: 6.05 K/UL (ref 1.8–7.7)
NEUTROPHILS NFR BLD AUTO: 65 % (ref 53–65)
NITRITE UR QL STRIP: NEGATIVE
NRBC # BLD AUTO: 0 X10E3/UL
NRBC, AUTO (.00): 0 %
PH UR STRIP: 6.5 PH UNITS
PLATELET # BLD AUTO: 250 K/UL (ref 150–400)
POTASSIUM SERPL-SCNC: 3.8 MMOL/L (ref 3.5–5.1)
PROT SERPL-MCNC: 6.1 G/DL (ref 6.4–8.3)
PROT UR QL STRIP: 30
PROT UR-MCNC: 45.4 MG/DL
PROT/CREAT 24H UR-RTO: 0.38 (ref 0–0.2)
RBC # BLD AUTO: 3.99 M/UL (ref 4.2–5.4)
RBC # UR STRIP: NEGATIVE /UL
RBC #/AREA URNS HPF: 1 /HPF
RH BLD: NORMAL
SODIUM SERPL-SCNC: 134 MMOL/L (ref 136–145)
SP GR UR STRIP: 1.02
SPECIMEN OUTDATE: NORMAL
SQUAMOUS #/AREA URNS LPF: ABNORMAL /HPF
SYPHILIS AB INTERPRETATION: NORMAL
URATE SERPL-MCNC: 6.1 MG/DL (ref 2.6–6)
UROBILINOGEN UR STRIP-ACNC: NORMAL MG/DL
WBC # BLD AUTO: 9.31 K/UL (ref 4.5–11)
WBC #/AREA URNS HPF: 14 /HPF

## 2024-12-09 PROCEDURE — 86901 BLOOD TYPING SEROLOGIC RH(D): CPT | Performed by: OBSTETRICS & GYNECOLOGY

## 2024-12-09 PROCEDURE — 3E0P7VZ INTRODUCTION OF HORMONE INTO FEMALE REPRODUCTIVE, VIA NATURAL OR ARTIFICIAL OPENING: ICD-10-PCS | Performed by: OBSTETRICS & GYNECOLOGY

## 2024-12-09 PROCEDURE — 99213 OFFICE O/P EST LOW 20 MIN: CPT | Mod: PBBFAC | Performed by: OBSTETRICS & GYNECOLOGY

## 2024-12-09 PROCEDURE — 87086 URINE CULTURE/COLONY COUNT: CPT | Performed by: OBSTETRICS & GYNECOLOGY

## 2024-12-09 PROCEDURE — 59426 ANTEPARTUM CARE ONLY: CPT | Mod: S$PBB,TH,, | Performed by: OBSTETRICS & GYNECOLOGY

## 2024-12-09 PROCEDURE — 63600175 PHARM REV CODE 636 W HCPCS: Performed by: OBSTETRICS & GYNECOLOGY

## 2024-12-09 PROCEDURE — 84550 ASSAY OF BLOOD/URIC ACID: CPT | Performed by: OBSTETRICS & GYNECOLOGY

## 2024-12-09 PROCEDURE — 83615 LACTATE (LD) (LDH) ENZYME: CPT | Performed by: OBSTETRICS & GYNECOLOGY

## 2024-12-09 PROCEDURE — 99999 PR PBB SHADOW E&M-EST. PATIENT-LVL III: CPT | Mod: PBBFAC,,, | Performed by: OBSTETRICS & GYNECOLOGY

## 2024-12-09 PROCEDURE — 86780 TREPONEMA PALLIDUM: CPT | Performed by: OBSTETRICS & GYNECOLOGY

## 2024-12-09 PROCEDURE — 25000003 PHARM REV CODE 250: Performed by: OBSTETRICS & GYNECOLOGY

## 2024-12-09 PROCEDURE — 80053 COMPREHEN METABOLIC PANEL: CPT | Performed by: OBSTETRICS & GYNECOLOGY

## 2024-12-09 PROCEDURE — 87389 HIV-1 AG W/HIV-1&-2 AB AG IA: CPT | Performed by: OBSTETRICS & GYNECOLOGY

## 2024-12-09 PROCEDURE — 3E033VJ INTRODUCTION OF OTHER HORMONE INTO PERIPHERAL VEIN, PERCUTANEOUS APPROACH: ICD-10-PCS | Performed by: OBSTETRICS & GYNECOLOGY

## 2024-12-09 PROCEDURE — 84156 ASSAY OF PROTEIN URINE: CPT | Performed by: OBSTETRICS & GYNECOLOGY

## 2024-12-09 PROCEDURE — 87340 HEPATITIS B SURFACE AG IA: CPT | Performed by: OBSTETRICS & GYNECOLOGY

## 2024-12-09 PROCEDURE — 85025 COMPLETE CBC W/AUTO DIFF WBC: CPT | Performed by: OBSTETRICS & GYNECOLOGY

## 2024-12-09 PROCEDURE — 11000001 HC ACUTE MED/SURG PRIVATE ROOM

## 2024-12-09 PROCEDURE — 36415 COLL VENOUS BLD VENIPUNCTURE: CPT | Performed by: OBSTETRICS & GYNECOLOGY

## 2024-12-09 PROCEDURE — 81003 URINALYSIS AUTO W/O SCOPE: CPT | Performed by: OBSTETRICS & GYNECOLOGY

## 2024-12-09 RX ORDER — MISOPROSTOL 100 MCG
25 TABLET ORAL EVERY 4 HOURS PRN
Status: DISCONTINUED | OUTPATIENT
Start: 2024-12-09 | End: 2024-12-10

## 2024-12-09 RX ORDER — OXYTOCIN 10 [USP'U]/ML
10 INJECTION, SOLUTION INTRAMUSCULAR; INTRAVENOUS ONCE AS NEEDED
Status: DISCONTINUED | OUTPATIENT
Start: 2024-12-09 | End: 2024-12-13 | Stop reason: HOSPADM

## 2024-12-09 RX ORDER — SODIUM CHLORIDE, SODIUM LACTATE, POTASSIUM CHLORIDE, CALCIUM CHLORIDE 600; 310; 30; 20 MG/100ML; MG/100ML; MG/100ML; MG/100ML
INJECTION, SOLUTION INTRAVENOUS CONTINUOUS
Status: DISCONTINUED | OUTPATIENT
Start: 2024-12-09 | End: 2024-12-12

## 2024-12-09 RX ORDER — MISOPROSTOL 200 UG/1
800 TABLET ORAL ONCE AS NEEDED
Status: DISCONTINUED | OUTPATIENT
Start: 2024-12-09 | End: 2024-12-13 | Stop reason: HOSPADM

## 2024-12-09 RX ORDER — DIPHENOXYLATE HYDROCHLORIDE AND ATROPINE SULFATE 2.5; .025 MG/1; MG/1
2 TABLET ORAL EVERY 6 HOURS PRN
Status: DISCONTINUED | OUTPATIENT
Start: 2024-12-09 | End: 2024-12-13 | Stop reason: HOSPADM

## 2024-12-09 RX ORDER — BUTORPHANOL TARTRATE 2 MG/ML
1 INJECTION INTRAMUSCULAR; INTRAVENOUS
Status: DISCONTINUED | OUTPATIENT
Start: 2024-12-09 | End: 2024-12-11

## 2024-12-09 RX ORDER — MUPIROCIN 20 MG/G
OINTMENT TOPICAL
Status: DISCONTINUED | OUTPATIENT
Start: 2024-12-09 | End: 2024-12-11

## 2024-12-09 RX ORDER — OXYTOCIN-SODIUM CHLORIDE 0.9% IV SOLN 30 UNIT/500ML 30-0.9/5 UT/ML-%
10 SOLUTION INTRAVENOUS ONCE AS NEEDED
Status: COMPLETED | OUTPATIENT
Start: 2024-12-09 | End: 2024-12-11

## 2024-12-09 RX ORDER — OXYTOCIN/0.9 % SODIUM CHLORIDE 15/250 ML
0-32 PLASTIC BAG, INJECTION (ML) INTRAVENOUS CONTINUOUS
Status: DISCONTINUED | OUTPATIENT
Start: 2024-12-09 | End: 2024-12-11

## 2024-12-09 RX ORDER — TERBUTALINE SULFATE 1 MG/ML
0.25 INJECTION SUBCUTANEOUS
Status: DISCONTINUED | OUTPATIENT
Start: 2024-12-09 | End: 2024-12-13 | Stop reason: HOSPADM

## 2024-12-09 RX ORDER — LIDOCAINE HYDROCHLORIDE 10 MG/ML
10 INJECTION, SOLUTION INFILTRATION; PERINEURAL ONCE AS NEEDED
Status: DISCONTINUED | OUTPATIENT
Start: 2024-12-09 | End: 2024-12-13 | Stop reason: HOSPADM

## 2024-12-09 RX ORDER — OXYTOCIN/0.9 % SODIUM CHLORIDE 15/250 ML
95 PLASTIC BAG, INJECTION (ML) INTRAVENOUS CONTINUOUS PRN
Status: DISCONTINUED | OUTPATIENT
Start: 2024-12-09 | End: 2024-12-11

## 2024-12-09 RX ORDER — METHYLERGONOVINE MALEATE 0.2 MG/ML
200 INJECTION INTRAVENOUS ONCE AS NEEDED
Status: DISCONTINUED | OUTPATIENT
Start: 2024-12-09 | End: 2024-12-13 | Stop reason: HOSPADM

## 2024-12-09 RX ORDER — OXYTOCIN/0.9 % SODIUM CHLORIDE 15/250 ML
95 PLASTIC BAG, INJECTION (ML) INTRAVENOUS ONCE AS NEEDED
Status: DISCONTINUED | OUTPATIENT
Start: 2024-12-09 | End: 2024-12-11

## 2024-12-09 RX ORDER — METOCLOPRAMIDE HYDROCHLORIDE 5 MG/ML
5 INJECTION INTRAMUSCULAR; INTRAVENOUS EVERY 6 HOURS PRN
Status: DISCONTINUED | OUTPATIENT
Start: 2024-12-09 | End: 2024-12-13 | Stop reason: HOSPADM

## 2024-12-09 RX ORDER — BUTORPHANOL TARTRATE 2 MG/ML
2 INJECTION INTRAMUSCULAR; INTRAVENOUS
Status: DISCONTINUED | OUTPATIENT
Start: 2024-12-09 | End: 2024-12-11

## 2024-12-09 RX ORDER — SIMETHICONE 80 MG
1 TABLET,CHEWABLE ORAL 4 TIMES DAILY PRN
Status: DISCONTINUED | OUTPATIENT
Start: 2024-12-09 | End: 2024-12-11

## 2024-12-09 RX ORDER — TRANEXAMIC ACID 10 MG/ML
1000 INJECTION, SOLUTION INTRAVENOUS EVERY 30 MIN PRN
Status: DISCONTINUED | OUTPATIENT
Start: 2024-12-09 | End: 2024-12-13 | Stop reason: HOSPADM

## 2024-12-09 RX ORDER — CALCIUM CARBONATE 200(500)MG
500 TABLET,CHEWABLE ORAL 3 TIMES DAILY PRN
Status: DISCONTINUED | OUTPATIENT
Start: 2024-12-09 | End: 2024-12-13 | Stop reason: HOSPADM

## 2024-12-09 RX ORDER — ONDANSETRON 4 MG/1
8 TABLET, ORALLY DISINTEGRATING ORAL EVERY 8 HOURS PRN
Status: DISCONTINUED | OUTPATIENT
Start: 2024-12-09 | End: 2024-12-11

## 2024-12-09 RX ORDER — CARBOPROST TROMETHAMINE 250 UG/ML
250 INJECTION, SOLUTION INTRAMUSCULAR
Status: DISCONTINUED | OUTPATIENT
Start: 2024-12-09 | End: 2024-12-13 | Stop reason: HOSPADM

## 2024-12-09 RX ORDER — OXYTOCIN-SODIUM CHLORIDE 0.9% IV SOLN 30 UNIT/500ML 30-0.9/5 UT/ML-%
30 SOLUTION INTRAVENOUS ONCE AS NEEDED
Status: DISCONTINUED | OUTPATIENT
Start: 2024-12-09 | End: 2024-12-13 | Stop reason: HOSPADM

## 2024-12-09 RX ORDER — SODIUM CHLORIDE 9 MG/ML
INJECTION, SOLUTION INTRAVENOUS
Status: DISCONTINUED | OUTPATIENT
Start: 2024-12-09 | End: 2024-12-11

## 2024-12-09 RX ORDER — CEFAZOLIN 2 G/1
2 INJECTION, POWDER, FOR SOLUTION INTRAMUSCULAR; INTRAVENOUS ONCE AS NEEDED
Status: DISCONTINUED | OUTPATIENT
Start: 2024-12-09 | End: 2024-12-13 | Stop reason: HOSPADM

## 2024-12-09 RX ORDER — ACETAMINOPHEN 325 MG/1
650 TABLET ORAL EVERY 6 HOURS PRN
Status: DISCONTINUED | OUTPATIENT
Start: 2024-12-09 | End: 2024-12-11

## 2024-12-09 RX ADMIN — SODIUM CHLORIDE, POTASSIUM CHLORIDE, SODIUM LACTATE AND CALCIUM CHLORIDE: 600; 310; 30; 20 INJECTION, SOLUTION INTRAVENOUS at 04:12

## 2024-12-09 RX ADMIN — MISOPROSTOL 25 MCG: 100 TABLET ORAL at 01:12

## 2024-12-09 RX ADMIN — SODIUM CHLORIDE, POTASSIUM CHLORIDE, SODIUM LACTATE AND CALCIUM CHLORIDE: 600; 310; 30; 20 INJECTION, SOLUTION INTRAVENOUS at 11:12

## 2024-12-09 RX ADMIN — SODIUM CHLORIDE, POTASSIUM CHLORIDE, SODIUM LACTATE AND CALCIUM CHLORIDE: 600; 310; 30; 20 INJECTION, SOLUTION INTRAVENOUS at 12:12

## 2024-12-09 RX ADMIN — MISOPROSTOL 25 MCG: 100 TABLET ORAL at 05:12

## 2024-12-09 RX ADMIN — MISOPROSTOL 25 MCG: 100 TABLET ORAL at 11:12

## 2024-12-09 NOTE — PROGRESS NOTES
Return OB Visit    19 y.o. female  at 37w5d   She c/o facial swelling. She also c/o feeling dizzy overnight. Denies any HA, vision changes, RUQ pain.  Reports good fetal movement or fluttering. Denies any vaginal bleeding, leakage of fluid, cramping, contractions, or pressure.   Total weight gain/weight loss in pregnancy: 15.9 kg (35 lb)     MFM appointment Friday growh 40% and BPP today: vtx, DESTINY 14 cm, and BPP 8/8.    Vitals  BP: (!) 141/102  Pulse: 92  Weight: 61.7 kg (136 lb)  Prenatal  Fetal Heart Rate: 138  Movement: Present  Edema  LLE Edema: None  RLE Edema: None  Facial: Mild pitting, slight indentation  Additional Edema?: No  Cervical Exam  Presentation: Vertex    Prenatal Labs:  Lab Results   Component Value Date    GROUPTRH A POS 2024    HGB 10.9 (L) 2024    HCT 33.9 (L) 2024     2024    SICKLE Negative 2024    HEPBSAG Non-Reactive 2024    [id number] Non-Reactive 10/01/2024    LABNGO Negative 2024    LABURIN Skin/Urogenital Tanya Isolated, no further workup. 2024       A: 37w5d           ICD-10-CM ICD-9-CM    1. 37 weeks gestation of pregnancy  Z3A.37 V22.2 POCT URINALYSIS W/O SCOPE      2. Gestational hypertension, third trimester  O13.3 642.33           No pregnancy checklist tasks were completed during this visit, and no tasks are pending completion.    -Benefits of breastfeeding   -Rooming In and Skin to Skin    P: Bleeding, daily fetal kick counts, and  labor/labor precautions discussed.    Questions answered to desired level of satisfaction  Verbalized understanding to all information and instructions provided.  Follow up for To L&D for IOL today..  Due to worsening GHTN, IOL today instead of Wednesday. Pt agrees. All questions answered.    Micky Garcia MD

## 2024-12-09 NOTE — DISCHARGE INSTRUCTIONS
"Patient given "Your Guide to Postpartum and  Care" for education during this hospital stay. Booklet provided with information resources. Patient oriented on how to use QR codes and find topics in book, and oriented to flip book in patient room. Patient verbalized that they are able to read and understand material provided to them. Instructed to call nurses for questions. Mom educated on benefits of skin to skin for at least 1 hour after delivery, the benefits of rooming in with infant in helping mom to learn and respond to feeding cues, caring for their infant, and bonding, and the importance on exclusive breastfeeding for the first 6 months.       Topic Page  Nurse Date Time Comments   PP Education Booklet Given ---- ke 2024 1245  Booklet given and discussed. 24 at 10:12 All discharge instructions were given on 24 at 10:12,pt verbalized understanding with PP booklet given   Skin to skin 18 ke     Rooming in 29 ke     Importance of Exclusive Breastfeeding for 6 mo 35 ke     Bleeding 6 ke     Incision Care 10 ke     Sex 11 ke     Pain Management 8 ke     Perineal Care 9 ke     Minimizing Engorgement 40 ke     Collection & Storage of BM 42-43 ke     S/S of BF Problems  ke     Hand Expression 42 ke     Maternal s/s breast problems 41 ke     Mgnt of Common BF Problems (engorgement, sore & cracked nipples) 40-41 ke     PPD/Anxiety 14-15 ke        "

## 2024-12-10 ENCOUNTER — ANESTHESIA (OUTPATIENT)
Dept: OBSTETRICS AND GYNECOLOGY | Facility: HOSPITAL | Age: 19
End: 2024-12-10
Payer: MEDICAID

## 2024-12-10 ENCOUNTER — ANESTHESIA EVENT (OUTPATIENT)
Dept: OBSTETRICS AND GYNECOLOGY | Facility: HOSPITAL | Age: 19
End: 2024-12-10
Payer: MEDICAID

## 2024-12-10 PROCEDURE — 62326 NJX INTERLAMINAR LMBR/SAC: CPT | Performed by: ANESTHESIOLOGY

## 2024-12-10 PROCEDURE — 63600175 PHARM REV CODE 636 W HCPCS: Performed by: ANESTHESIOLOGY

## 2024-12-10 PROCEDURE — 25000003 PHARM REV CODE 250: Performed by: OBSTETRICS & GYNECOLOGY

## 2024-12-10 PROCEDURE — 25000003 PHARM REV CODE 250: Performed by: ANESTHESIOLOGY

## 2024-12-10 PROCEDURE — 63600175 PHARM REV CODE 636 W HCPCS: Performed by: OBSTETRICS & GYNECOLOGY

## 2024-12-10 PROCEDURE — 11000001 HC ACUTE MED/SURG PRIVATE ROOM

## 2024-12-10 RX ORDER — ROPIVACAINE HYDROCHLORIDE 7.5 MG/ML
INJECTION, SOLUTION EPIDURAL; PERINEURAL
Status: COMPLETED | OUTPATIENT
Start: 2024-12-10 | End: 2024-12-10

## 2024-12-10 RX ORDER — FENTANYL/ROPIVACAINE/NS/PF 2MCG/ML-.2
PLASTIC BAG, INJECTION (ML) INJECTION CONTINUOUS
Status: DISCONTINUED | OUTPATIENT
Start: 2024-12-10 | End: 2024-12-11

## 2024-12-10 RX ADMIN — SODIUM CHLORIDE, POTASSIUM CHLORIDE, SODIUM LACTATE AND CALCIUM CHLORIDE: 600; 310; 30; 20 INJECTION, SOLUTION INTRAVENOUS at 08:12

## 2024-12-10 RX ADMIN — SODIUM CHLORIDE, POTASSIUM CHLORIDE, SODIUM LACTATE AND CALCIUM CHLORIDE: 600; 310; 30; 20 INJECTION, SOLUTION INTRAVENOUS at 06:12

## 2024-12-10 RX ADMIN — ROPIVACAINE HYDROCHLORIDE 500 MCG: 10 INJECTION, SOLUTION EPIDURAL at 09:12

## 2024-12-10 RX ADMIN — ONDANSETRON 8 MG: 4 TABLET, ORALLY DISINTEGRATING ORAL at 10:12

## 2024-12-10 RX ADMIN — ROPIVACAINE HYDROCHLORIDE 5 ML: 7.5 INJECTION, SOLUTION EPIDURAL; PERINEURAL at 08:12

## 2024-12-10 RX ADMIN — BUTORPHANOL TARTRATE 2 MG: 2 INJECTION, SOLUTION INTRAMUSCULAR; INTRAVENOUS at 01:12

## 2024-12-10 RX ADMIN — ROPIVACAINE HYDROCHLORIDE 10 ML/HR: 10 INJECTION, SOLUTION EPIDURAL at 08:12

## 2024-12-10 RX ADMIN — BUTORPHANOL TARTRATE 2 MG: 2 INJECTION, SOLUTION INTRAMUSCULAR; INTRAVENOUS at 06:12

## 2024-12-10 RX ADMIN — OXYTOCIN 2 MILLI-UNITS/MIN: 10 INJECTION, SOLUTION INTRAMUSCULAR; INTRAVENOUS at 04:12

## 2024-12-10 RX ADMIN — BUTORPHANOL TARTRATE 2 MG: 2 INJECTION, SOLUTION INTRAMUSCULAR; INTRAVENOUS at 09:12

## 2024-12-10 NOTE — H&P
[facility] -  Labor and Delivery  Obstetrics  History & Physical    Patient Name: Merle Hinojosa  MRN: [id number]  Admission Date: 2024  Primary Care Provider: Danielle, Primary Doctor    Late entry--Admitted 2024    Subjective:     Principal Problem:Mild pre-eclampsia in third trimester    History of Present Illness: 18 yo  c IUP @ 37+6 weeks was a direct admit from clinic yesterday afternoon. She had elevated blood pressures in the 140s/100s. Her Bps the last 2 weeks have been 130s-140s/80s-90s. She was diagnosed with Gestational HTN and was planned to be induced on 2024. However, due to increasingly elevated blood pressures, the decision was made to admit her and deliver her on 2024. Also she was having more facial edema.   She denies any HA, RUQ pain, vision changes.   Her US on 2024: vtx, DESTINY 14 cm, BPP 8/8.  Her growth US at [facility] on 2024 (due to fetal pyelectasis) showed at EFW of 6 lbs 9 oz (40%), bilateral fetal pelvis dilation noted.    Obstetric HPI:  Patient reports irregular contractions, active fetal movement, No vaginal bleeding , No loss of fluid     This pregnancy has been complicated by fetal pyelectasis.    OB History    Para Term  AB Living   1 0 0 0 0 0   SAB IAB Ectopic Multiple Live Births   0 0 0 0 0      # Outcome Date GA Lbr Rashid/2nd Weight Sex Type Anes PTL Lv   1 Current              History reviewed. No pertinent past medical history.  Past Surgical History:   Procedure Laterality Date    FRACTURE SURGERY Left     Repair of left pinky finger       PTA Medications   Medication Sig    EPINEPHrine (EPIPEN JR) 0.15 mg/0.3 mL pen injection Inject 0.15 mg into the muscle as needed for Anaphylaxis.    ondansetron (ZOFRAN) 4 MG tablet Take 1 tablet (4 mg total) by mouth every 8 (eight) hours as needed for Nausea. (Patient not taking: Reported on 2024)       Review of patient's allergies indicates:   Allergen Reactions    Fire ant       ants    Venom-honey bee Edema    Wasp venom         Family History       Problem Relation (Age of Onset)    Cancer Paternal Aunt, Maternal Grandmother, Maternal Grandfather, Paternal Grandmother    No Known Problems Mother, Father, Sister, Brother, Sister          Tobacco Use    Smoking status: Every Day     Types: Vaping with nicotine     Passive exposure: Current    Smokeless tobacco: Never    Tobacco comments:     Former cigarettes   Substance and Sexual Activity    Alcohol use: Never    Drug use: Never    Sexual activity: Yes     Partners: Male     Review of Systems   Constitutional:  Negative for fatigue.   Eyes:  Negative for visual disturbance.   Gastrointestinal:  Negative for abdominal pain.   Musculoskeletal:  Positive for back pain.   Neurological:  Negative for headaches.      Objective:     Vital Signs (Most Recent):  Temp: 98 °F (36.7 °C) (12/10/24 0714)  Pulse: 71 (12/10/24 0714)  Resp: 20 (12/10/24 0714)  BP: 138/77 (12/10/24 0714)  SpO2: 99 % (12/10/24 0714) Vital Signs (24h Range):  Temp:  [97 °F (36.1 °C)-98.4 °F (36.9 °C)] 98 °F (36.7 °C)  Pulse:  [] 71  Resp:  [18-20] 20  SpO2:  [98 %-99 %] 99 %  BP: (111-152)/() 138/77     Weight: 58.9 kg (129 lb 12.8 oz)  Body mass index is 21.6 kg/m².    FHT: 140s moderate variability +accels. Rare variable and late decelerations. Cat 1 (reassuring)  TOCO:  Q 2 minutes    Physical Exam:   Constitutional: She is oriented to person, place, and time. She appears well-developed and well-nourished.    HENT:   Head: Normocephalic and atraumatic.      Cardiovascular:  Normal rate.      Exam reveals edema (Mild facial edema).        Pulmonary/Chest: Effort normal.        Abdominal: Soft. There is no abdominal tenderness.   gravid                 Neurological: She is alert and oriented to person, place, and time.    Skin: Skin is warm and dry.    Psychiatric: She has a normal mood and affect. Her behavior is normal.       Cervix:  Dilation:  1  Effacement:  " 50%  Station: -2  Presentation: Vertex    Cervix midposition.      Significant Labs:  Lab Results   Component Value Date    GROUPTRH A POS 12/09/2024    HEPBSAG Non-Reactive 12/09/2024       CBC:   Recent Labs   Lab 12/09/24  1219   WBC 9.31   RBC 3.99*   HGB 11.1*   HCT 34.6*      MCV 86.7   MCH 27.8   MCHC 32.1     CMP:   Recent Labs   Lab 12/09/24  1219   GLU 71*   CALCIUM 8.4   ALBUMIN 2.5*   PROT 6.1*   *   K 3.8   CO2 19*      BUN 9   CREATININE 0.67   ALKPHOS 255*   ALT 12   AST 36*   BILITOT 0.3     Sommer/Creat Ratio: No results for input(s): "UTPCR" in the last 48 hours.    Recent Labs   Lab 12/09/24  1242   COLORU Light-Yellow   SPECGRAV 1.017   PHUR 6.5   PROTEINUA 30*   BACTERIA Occasional*   NITRITE Negative   LEUKOCYTESUR Small*   UROBILINOGEN Normal     I have personallly reviewed all pertinent lab results from the last 24 hours.      Uric acid 6.1 (elevated)  Spot Pr/Cr 0.382 (elevated)  Recent Lab Results         12/09/24  1242   12/09/24  1219        Albumin/Globulin Ratio   0.7       Albumin   2.5       ALP   255       ALT   12       Anion Gap   13       Appearance, UA Turbid         AST   36       Bacteria, UA Occasional         Baso #   0.03       Basophil %   0.3       Bilirubin (UA) Negative         BILIRUBIN TOTAL   0.3       BUN   9       BUN/CREAT RATIO   13       Calcium   8.4       Chloride   106       CO2   19       Color, UA Light-Yellow         Creatinine   0.67       Urine Creatinine 119         Differential Method   Auto       eGFR   129       Eos #   0.00       Eos %   0.0       Globulin, Total   3.6       Glucose   71       Glucose, UA Normal         Group & Rh   A POS       Hematocrit   34.6       Hemoglobin   11.1       Hepatitis B Surface Ag   Non-Reactive       HIV 1/2 Ag/Ab   Non-Reactive  Comment: Upon Admission. PRN third-trimester screen not allan       Immature Grans (Abs)   0.03       Immature Granulocytes   0.3       INDIRECT ABEL   NEG       " Ketones, UA Negative         Lactate Dehydrogenase   204       Leukocyte Esterase, UA Small         Lymph #   2.42       Lymph %   26.0       MCH   27.8       MCHC   32.1       MCV   86.7       Mono #   0.78       Mono %   8.4       MPV   11.5       Mucous Occasional         Neutrophils, Abs   6.05       Neutrophils Relative   65.0       NITRITE UA Negative         nRBC   0.0       NUCLEATED RBC ABSOLUTE   0.00       Blood, UA Negative         pH, UA 6.5         Platelet Count   250       Potassium   3.8       PROTEIN TOTAL   6.1       Protein, UA 30         Urine Protein 45.4         Protein/Creat Ratio 0.382         RBC   3.99       RBC, UA 1         RDW   12.3       Sodium   134       Spec Grav UA 1.017         Specimen Outdate   2024 23:59       Squamous Epithelial Cells, UA Few         Syphilis Ab Interpretation   Non-Reactive  Comment: Upon Admission. PRN third-trimester screen not allan  0.0 - 0.9: Non-Reactive  0.91 - 1.10: Equivocal with RPR to follow  >1.10:  Reactive with RPR to Follow       Uric Acid   6.1       Urine Culture No Growth To Date  [P]         UROBILINOGEN UA Normal         WBC, UA 14         WBC   9.31                [P] - Preliminary Result               Assessment/Plan:     19 y.o. female  at 37w6d for:    Active Diagnoses:    Diagnosis Date Noted POA    PRINCIPAL PROBLEM:  Mild pre-eclampsia in third trimester [O14.03] 2024 Yes    Encounter for repeat ultrasound of fetal pyelectasis, antepartum [O35.EXX0] 2024 Not Applicable    37 weeks gestation of pregnancy [Z3A.37] 2024 Not Applicable      Problems Resolved During this Admission:       She is s/p 3 doses of Cytotec. She is now on Pitocin for IOL.   No severe features.  Bps normal to mild range since admission.   Due to Spot Pr/Cr > 0.3, she meets criteria for pre-eclampsia without severe features.  Continue with induction of labor. Offered Cook's catheter to help decrease induction time. She declines. Plan  AROM once she gets to 2-3 cm and place IUPC. She agrees with plan.  Monitor for acute changes.  Pain management prn.  Anticipate .    Inform Peds of fetal pyelectasis. MFM recommended fetal renal US after birth.    Micky Garcia MD  Obstetrics  [facility] -  Labor and Delivery

## 2024-12-10 NOTE — PROGRESS NOTES
SVE: 1/50/-2. Discussed option of Cook's catheter vs AROM. She opts for Cook's catheter. It was placed without complication. Uterine balloon infused with 80 cc of saline. Vaginal balloon infused with 80 cc of saline.  Declines pain management currently. She recently had a dose of Stadol.   Encouraged to ask for epidural when needed. She verbalized her understanding.   Monitor closely.

## 2024-12-11 LAB — UA COMPLETE W REFLEX CULTURE PNL UR: NORMAL

## 2024-12-11 PROCEDURE — 11000001 HC ACUTE MED/SURG PRIVATE ROOM

## 2024-12-11 PROCEDURE — 72100003 HC LABOR CARE, EA. ADDL. 8 HRS

## 2024-12-11 PROCEDURE — 72100002 HC LABOR CARE, 1ST 8 HOURS

## 2024-12-11 PROCEDURE — 25000003 PHARM REV CODE 250: Performed by: OBSTETRICS & GYNECOLOGY

## 2024-12-11 PROCEDURE — 88307 TISSUE EXAM BY PATHOLOGIST: CPT | Mod: TC,SUR | Performed by: OBSTETRICS & GYNECOLOGY

## 2024-12-11 PROCEDURE — 59410 OBSTETRICAL CARE: CPT | Mod: TH,,, | Performed by: OBSTETRICS & GYNECOLOGY

## 2024-12-11 PROCEDURE — 88307 TISSUE EXAM BY PATHOLOGIST: CPT | Mod: 26,,, | Performed by: PATHOLOGY

## 2024-12-11 PROCEDURE — 0HQ9XZZ REPAIR PERINEUM SKIN, EXTERNAL APPROACH: ICD-10-PCS | Performed by: OBSTETRICS & GYNECOLOGY

## 2024-12-11 PROCEDURE — 51702 INSERT TEMP BLADDER CATH: CPT

## 2024-12-11 PROCEDURE — 72200005 HC VAGINAL DELIVERY LEVEL II

## 2024-12-11 RX ORDER — PRENATAL WITH FERROUS FUM AND FOLIC ACID 3080; 920; 120; 400; 22; 1.84; 3; 20; 10; 1; 12; 200; 27; 25; 2 [IU]/1; [IU]/1; MG/1; [IU]/1; MG/1; MG/1; MG/1; MG/1; MG/1; MG/1; UG/1; MG/1; MG/1; MG/1; MG/1
1 TABLET ORAL DAILY
Status: DISCONTINUED | OUTPATIENT
Start: 2024-12-11 | End: 2024-12-13 | Stop reason: HOSPADM

## 2024-12-11 RX ORDER — OXYCODONE AND ACETAMINOPHEN 5; 325 MG/1; MG/1
1 TABLET ORAL EVERY 4 HOURS PRN
Status: DISCONTINUED | OUTPATIENT
Start: 2024-12-11 | End: 2024-12-13 | Stop reason: HOSPADM

## 2024-12-11 RX ORDER — NIFEDIPINE 30 MG/1
30 TABLET, EXTENDED RELEASE ORAL DAILY
Status: DISCONTINUED | OUTPATIENT
Start: 2024-12-11 | End: 2024-12-13 | Stop reason: HOSPADM

## 2024-12-11 RX ORDER — DIPHENHYDRAMINE HYDROCHLORIDE 50 MG/ML
25 INJECTION INTRAMUSCULAR; INTRAVENOUS EVERY 4 HOURS PRN
Status: DISCONTINUED | OUTPATIENT
Start: 2024-12-11 | End: 2024-12-13 | Stop reason: HOSPADM

## 2024-12-11 RX ORDER — IBUPROFEN 600 MG/1
600 TABLET ORAL EVERY 6 HOURS PRN
Status: DISCONTINUED | OUTPATIENT
Start: 2024-12-11 | End: 2024-12-13 | Stop reason: HOSPADM

## 2024-12-11 RX ORDER — DOCUSATE SODIUM 100 MG/1
200 CAPSULE, LIQUID FILLED ORAL 2 TIMES DAILY PRN
Status: DISCONTINUED | OUTPATIENT
Start: 2024-12-11 | End: 2024-12-13 | Stop reason: HOSPADM

## 2024-12-11 RX ORDER — SIMETHICONE 80 MG
1 TABLET,CHEWABLE ORAL EVERY 6 HOURS PRN
Status: DISCONTINUED | OUTPATIENT
Start: 2024-12-11 | End: 2024-12-13 | Stop reason: HOSPADM

## 2024-12-11 RX ORDER — ONDANSETRON HYDROCHLORIDE 2 MG/ML
4 INJECTION, SOLUTION INTRAVENOUS EVERY 6 HOURS PRN
Status: DISCONTINUED | OUTPATIENT
Start: 2024-12-11 | End: 2024-12-13 | Stop reason: HOSPADM

## 2024-12-11 RX ORDER — OXYCODONE AND ACETAMINOPHEN 10; 325 MG/1; MG/1
1 TABLET ORAL EVERY 4 HOURS PRN
Status: DISCONTINUED | OUTPATIENT
Start: 2024-12-11 | End: 2024-12-13 | Stop reason: HOSPADM

## 2024-12-11 RX ORDER — ONDANSETRON 4 MG/1
8 TABLET, ORALLY DISINTEGRATING ORAL EVERY 8 HOURS PRN
Status: DISCONTINUED | OUTPATIENT
Start: 2024-12-11 | End: 2024-12-13 | Stop reason: HOSPADM

## 2024-12-11 RX ORDER — OXYTOCIN/0.9 % SODIUM CHLORIDE 15/250 ML
95 PLASTIC BAG, INJECTION (ML) INTRAVENOUS CONTINUOUS PRN
Status: DISCONTINUED | OUTPATIENT
Start: 2024-12-11 | End: 2024-12-13 | Stop reason: HOSPADM

## 2024-12-11 RX ORDER — DIPHENHYDRAMINE HCL 25 MG
25 CAPSULE ORAL EVERY 4 HOURS PRN
Status: DISCONTINUED | OUTPATIENT
Start: 2024-12-11 | End: 2024-12-13 | Stop reason: HOSPADM

## 2024-12-11 RX ORDER — FAMOTIDINE 10 MG/ML
20 INJECTION INTRAVENOUS ONCE AS NEEDED
Status: DISCONTINUED | OUTPATIENT
Start: 2024-12-11 | End: 2024-12-13 | Stop reason: HOSPADM

## 2024-12-11 RX ORDER — NIFEDIPINE 30 MG/1
30 TABLET, EXTENDED RELEASE ORAL DAILY
Status: DISCONTINUED | OUTPATIENT
Start: 2024-12-11 | End: 2024-12-11

## 2024-12-11 RX ORDER — SODIUM CHLORIDE 0.9 % (FLUSH) 0.9 %
10 SYRINGE (ML) INJECTION EVERY 8 HOURS PRN
Status: DISCONTINUED | OUTPATIENT
Start: 2024-12-11 | End: 2024-12-13 | Stop reason: HOSPADM

## 2024-12-11 RX ORDER — EPHEDRINE SULFATE 50 MG/ML
10 INJECTION, SOLUTION INTRAVENOUS ONCE AS NEEDED
Status: DISCONTINUED | OUTPATIENT
Start: 2024-12-11 | End: 2024-12-13 | Stop reason: HOSPADM

## 2024-12-11 RX ORDER — SODIUM CITRATE AND CITRIC ACID MONOHYDRATE 334; 500 MG/5ML; MG/5ML
30 SOLUTION ORAL ONCE AS NEEDED
Status: DISCONTINUED | OUTPATIENT
Start: 2024-12-11 | End: 2024-12-13 | Stop reason: HOSPADM

## 2024-12-11 RX ORDER — OXYCODONE HYDROCHLORIDE 5 MG/1
10 TABLET ORAL EVERY 6 HOURS PRN
Status: DISCONTINUED | OUTPATIENT
Start: 2024-12-11 | End: 2024-12-13 | Stop reason: HOSPADM

## 2024-12-11 RX ORDER — HYDROCORTISONE 25 MG/G
CREAM TOPICAL 3 TIMES DAILY PRN
Status: DISCONTINUED | OUTPATIENT
Start: 2024-12-11 | End: 2024-12-13 | Stop reason: HOSPADM

## 2024-12-11 RX ORDER — ACETAMINOPHEN 325 MG/1
650 TABLET ORAL EVERY 6 HOURS PRN
Status: DISCONTINUED | OUTPATIENT
Start: 2024-12-11 | End: 2024-12-13 | Stop reason: HOSPADM

## 2024-12-11 RX ADMIN — IBUPROFEN 600 MG: 600 TABLET, FILM COATED ORAL at 08:12

## 2024-12-11 RX ADMIN — OXYTOCIN-SODIUM CHLORIDE 0.9% IV SOLN 30 UNIT/500ML 10 UNITS: 30-0.9/5 SOLUTION at 04:12

## 2024-12-11 RX ADMIN — IBUPROFEN 600 MG: 600 TABLET, FILM COATED ORAL at 04:12

## 2024-12-11 RX ADMIN — Medication 1 TABLET: at 08:12

## 2024-12-11 RX ADMIN — OXYCODONE HYDROCHLORIDE AND ACETAMINOPHEN 1 TABLET: 5; 325 TABLET ORAL at 10:12

## 2024-12-11 RX ADMIN — NIFEDIPINE 30 MG: 30 TABLET, FILM COATED, EXTENDED RELEASE ORAL at 06:12

## 2024-12-11 RX ADMIN — IBUPROFEN 600 MG: 600 TABLET, FILM COATED ORAL at 10:12

## 2024-12-11 NOTE — LACTATION NOTE
This note was copied from a baby's chart.  Breastfeeding rounds done, mom reports infant not latching well, infant sleeping, mom encouraged to call for assistance with next feed, mom denies questions or concerns, mom to call with any needs

## 2024-12-11 NOTE — ANESTHESIA PREPROCEDURE EVALUATION
12/10/2024  Merle Hinojosa is a 19 y.o., female.      Pre-op Assessment    I have reviewed the Patient Summary Reports.     I have reviewed the Nursing Notes. I have reviewed the NPO Status.   I have reviewed the Medications.     Review of Systems  Anesthesia Hx:  No problems with previous Anesthesia                Social:  Non-Smoker, No Alcohol Use       Hematology/Oncology:  Hematology Normal   Oncology Normal                                   EENT/Dental:  EENT/Dental Normal           Cardiovascular:     Hypertension                                          Pulmonary:  Pulmonary Normal                       Renal/:  Renal/ Normal                 Hepatic/GI:  Hepatic/GI Normal                    Musculoskeletal:  Musculoskeletal Normal                OB/GYN/PEDS:  IUP, TERM, LABOR    PIH           Neurological:  Neurology Normal                                      Endocrine:  Endocrine Normal            Dermatological:  Skin Normal    Psych:  Psychiatric Normal                    Physical Exam  General: Well nourished    Airway:  Mallampati: II / II  Mouth Opening: Normal  TM Distance: > 6 cm  Tongue: Normal  Neck ROM: Normal ROM    Chest/Lungs:  Clear to auscultation, Normal Respiratory Rate    Heart:  Rate: Normal  Rhythm: Regular Rhythm        Anesthesia Plan  Type of Anesthesia, risks & benefits discussed:    Anesthesia Type: Epidural  Intra-op Monitoring Plan: Standard ASA Monitors  Post Op Pain Control Plan: epidural analgesia  Induction:  IV  Informed Consent: Informed consent signed with the Patient and all parties understand the risks and agree with anesthesia plan.  All questions answered.   ASA Score: 2  Day of Surgery Review of History & Physical: H&P Update referred to the surgeon/provider.I have interviewed and examined the patient. I have reviewed the patient's H&P dated:     Ready For  Surgery From Anesthesia Perspective.     .

## 2024-12-11 NOTE — ANESTHESIA PROCEDURE NOTES
Epidural    Patient location during procedure: OB   Reason for block: primary anesthetic   Reason for block: labor analgesia requested by patient and obstetrician  Diagnosis: iup, term, labor   Start time: 12/10/2024 8:54 PM  Timeout: 12/10/2024 8:54 PM    Staffing  Performing Provider: Shawn Meléndez MD  Authorizing Provider: Shawn Meléndez MD    Staffing  Performed by: Shawn Meléndez MD  Authorized by: Shawn Meléndez MD        Preanesthetic Checklist  Completed: patient identified, IV checked, site marked, risks and benefits discussed, surgical consent, monitors and equipment checked, pre-op evaluation, timeout performed, anesthesia consent given, hand hygiene performed and patient being monitored  Preparation  Patient position: sitting  Prep: Betadine  Patient monitoring: ECG, Pulse Ox, continuous capnometry and Blood Pressure  Reason for block: primary anesthetic   Epidural  Skin Anesthetic: lidocaine 1%  Administration type: continuous  Approach: midline  Interspace: L3-4    Injection technique: CHANEL saline  Needle and Epidural Catheter  Needle type: Tuohy   Needle gauge: 18  Needle length: 3.5 inches  Catheter type: end hole  Insertion Attempts: 1  Test dose: 3 mL of lidocaine 1.5% with Epi 1-to-200,000  Additional Documentation: incremental injection  Needle localization: anatomical landmarks  Assessment  Ease of block: easy  Patient's tolerance of the procedure: comfortable throughout block No inadvertent dural puncture with Tuohy.  Dural puncture not performed with spinal needle    Medications:    Medications: ROPIvacaine (NAROPIN) injection 0.75% - Epidural   5 mL - 12/10/2024 8:55:00 PM

## 2024-12-11 NOTE — PROGRESS NOTES
[facility] -  Labor and Delivery  Obstetrics  Postpartum Progress Note    Patient Name: Merle Hinojosa  MRN: [id number]  Admission Date: 2024  Hospital Length of Stay: 2 days  Attending Physician: Micky Garcia MD  Primary Care Provider: Danielle, Primary Doctor    Subjective:     Principal Problem:Mild pre-eclampsia in third trimester    Hospital course: s/p , PPD#0, stable.    Interval History: Her Bps remained mildly elevated throughout her induction. However immediately postpartum she had Bps upwards of  150s-160s systolic. Therefore, she was started on Procardia XL 30 mg daily. Her Bps since then have been normal to mild range.  She denies any HA, RUQ pain, vision changes.    She is doing well this morning. She is tolerating a regular diet without nausea or vomiting. She is voiding spontaneously. She is ambulating. She has passed flatus, and has not a BM. Vaginal bleeding is moderate. She denies fever or chills. Abdominal pain is mild and controlled with oral medications. She Is breastfeeding. She is supplementing with formula and has discussed her concerns with lactation.     Objective:     Vital Signs (Most Recent):  Temp: 98 °F (36.7 °C) (24 121)  Pulse: 75 (24 121)  Resp: 18 (12/10/24 2103)  BP: 127/78 (24)  SpO2: 97 % (24) Vital Signs (24h Range):  Temp:  [97 °F (36.1 °C)-98 °F (36.7 °C)] 98 °F (36.7 °C)  Pulse:  [] 75  Resp:  [18-20] 18  SpO2:  [97 %-99 %] 97 %  BP: (119-178)/() 127/78     Weight: 58.9 kg (129 lb 12.8 oz)  Body mass index is 21.6 kg/m².      Intake/Output Summary (Last 24 hours) at 2024 1215  Last data filed at 2024 0513  Gross per 24 hour   Intake 1841.68 ml   Output 105 ml   Net 1736.68 ml       Physical Exam:   Constitutional: She is oriented to person, place, and time. She appears well-developed and well-nourished.       Cardiovascular:  Normal rate.      Exam reveals edema (1+ BLE edema).         Pulmonary/Chest: Effort normal.        Abdominal: Soft. There is no abdominal tenderness.   FF                 Neurological: She is alert and oriented to person, place, and time.    Skin: Skin is warm and dry.    Psychiatric: She has a normal mood and affect. Her behavior is normal.       Significant Labs:  Lab Results   Component Value Date    GROUPTRH A POS 2024    HEPBSAG Non-Reactive 2024     Recent Labs   Lab 24  1219   HGB 11.1*   HCT 34.6*       CBC:   Recent Labs   Lab 24  1219   WBC 9.31   RBC 3.99*   HGB 11.1*   HCT 34.6*      MCV 86.7   MCH 27.8   MCHC 32.1     CMP:   Recent Labs   Lab 24  1219   GLU 71*   CALCIUM 8.4   ALBUMIN 2.5*   PROT 6.1*   *   K 3.8   CO2 19*      BUN 9   CREATININE 0.67   ALKPHOS 255*   ALT 12   AST 36*   BILITOT 0.3     I have personallly reviewed all pertinent lab results from the last 24 hours.    Assessment/Plan:     19 y.o. female  at 38w0d for:    Active Diagnoses:    Diagnosis Date Noted POA    PRINCIPAL PROBLEM:  Mild pre-eclampsia in third trimester [O14.03] 2024 Yes    Encounter for repeat ultrasound of fetal pyelectasis, antepartum [O35.EXX0] 2024 Not Applicable    37 weeks gestation of pregnancy [Z3A.37] 2024 Not Applicable      Problems Resolved During this Admission:       She is doing well this AM.   Breast feeding support.   Plan to repeat labs in the AM.   Continue Procardia XL 30 mg daily.   Monitor for at least 48 h postpartum due to elevated blood pressures.  Monitor for acute changes.    Disposition: As patient meets milestones, will plan to discharge 2 days.    Micky Garcia MD  Obstetrics  [facility] -  Labor and Delivery

## 2024-12-11 NOTE — L&D DELIVERY NOTE
[facility] -  Labor and Delivery  Vaginal Delivery   Operative Note    SUMMARY     Normal spontaneous vaginal delivery of live infant, was placed on mothers abdomen for skin to skin and bulb suctioning performed.  Infant delivered position OA  over 1st degree midline perineum .  Nuchal cord: No.    Spontaneous delivery of placenta and IV pitocin given noting good uterine tone.  1st degree laceration noted and repaired with 2-0 Vicryl in running locking fashion at the introitus at midline. Right labial abrasion was hemostatic and did not require repair.  Patient tolerated delivery well. Sponge needle and lap counted correctly x2.    Indications: Mild pre-eclampsia in third trimester  Pregnancy complicated by:   Patient Active Problem List   Diagnosis    Metrorrhagia    Tonsillitis    Exposure to the flu    Viral illness    High risk sexual behavior    Mild pre-eclampsia in third trimester    Encounter for repeat ultrasound of fetal pyelectasis, antepartum    37 weeks gestation of pregnancy     Admitting GA: 38w0d    Delivery Information for Juancho Hinojosa    Birth information:  YOB: 2025   Time of birth: 4:12 AM   Sex: male   Head Delivery Date/Time:     Delivery type:    Gestational Age: 38w0d       Delivery Providers    Delivering clinician:            Measurements    Weight:   Length:          Apgars    Living status:   Apgar Component Scores:  1 min.:  5 min.:  10 min.:  15 min.:  20 min.:    Skin color:         Heart rate:         Reflex irritability:         Muscle tone:         Respiratory effort:         Total:                                  Interventions/Resuscitation           Cord    No data filed       Placenta    Placenta delivery date/time:   Placenta removal:            Labor Events:       labor:       Labor Onset Date/Time:         Dilation Complete Date/Time:         Start Pushing Date/Time:       Rupture Date/Time: 12/10/24 1958        Rupture type: [facility]  (Spontaneous Rupture)        Fluid Amount:       Fluid Color: Clear, Pinkish      steroids:       Antibiotics given for GBS:       Induction:       Indications for induction:        Augmentation:       Indications for augmentation:       Labor complications:       Additional complications:          Cervical ripening:                     Delivery:      Episiotomy:       Indication for Episiotomy:       Perineal Lacerations:   Repaired:      Periurethral Laceration:   Repaired:     Labial Laceration:   Repaired:     Sulcus Laceration:   Repaired:     Vaginal Laceration:   Repaired:     Cervical Laceration:   Repaired:     Repair suture:       Repair # of packets:       Last Value - EBL - Nursing (mL):       Sum - EBL - Nursing (mL): 0     Last Value - EBL - Anesthesia (mL):      Calculated QBL (mL):       Running total QBL (mL):       Vaginal Sweep Performed:       Surgicount Correct:         Other providers:            Details (if applicable):  Trial of Labor      Categorization:      Priority:     Indications for :     Incision Type:       Additional  information:  Forceps:    Vacuum:    Breech:    Observed anomalies    Other (Comments):

## 2024-12-11 NOTE — ANESTHESIA POSTPROCEDURE EVALUATION
Anesthesia Post Evaluation    Patient: Merle Hinojosa    Procedure(s) Performed: * No procedures listed *    Final Anesthesia Type: epidural      Patient location during evaluation: labor & delivery  Patient participation: Yes- Able to Participate  Level of consciousness: awake and alert  Post-procedure vital signs: reviewed and stable  Pain management: adequate  Airway patency: patent    PONV status at discharge: No PONV  Anesthetic complications: no      Cardiovascular status: blood pressure returned to baseline  Respiratory status: unassisted  Hydration status: euvolemic  Follow-up not needed.              Vitals Value Taken Time   /70 12/11/24 0639   Temp 36.7 °C (98 °F) 12/10/24 1915   Pulse 77 12/11/24 0639   Resp 18 12/10/24 2103   SpO2 99 % 12/10/24 1915   Vitals shown include unfiled device data.      No case tracking events are documented in the log.      Pain/Joel Score: Pain Rating Prior to Med Admin: 0 (12/10/2024  9:03 PM)  Pain Rating Post Med Admin: 3 (feeling better on her side) (12/10/2024  6:52 PM)

## 2024-12-11 NOTE — PROGRESS NOTES
[facility] -  Labor and Delivery  Obstetrics  Labor Progress Note    Patient Name: Merle Hinojosa  MRN: [id number]  Admission Date: 2024  Hospital Length of Stay: 2 days  Attending Physician: Micky Garcia MD  Primary Care Provider: Danielle, Primary Doctor    Subjective:     Principal Problem:Mild pre-eclampsia in third trimester    Interval History:  Merle is a 19 y.o.  at 38w0d. She is doing well. She is comfortable s/p epidural.    Objective:     Vital Signs (Most Recent):  Temp: 98 °F (36.7 °C) (12/10/24 1915)  Pulse: 72 (12/10/24 2000)  Resp: 18 (12/10/24 2103)  BP: (!) 140/81 (12/10/24 2000)  SpO2: 99 % (12/10/24 1915) Vital Signs (24h Range):  Temp:  [97 °F (36.1 °C)-98 °F (36.7 °C)] 98 °F (36.7 °C)  Pulse:  [] 72  Resp:  [18-20] 18  SpO2:  [98 %-99 %] 99 %  BP: (113-163)/(63-98) 140/81     Weight: 58.9 kg (129 lb 12.8 oz)  Body mass index is 21.6 kg/m².    FHT: 130s moderate variability +accels. +occasional late decels (resolve with position change) and occasional early decelerations. Cat 1 (reassuring)  TOCO:  Q 2 minutes    Physical Exam:   Constitutional: She is oriented to person, place, and time. She appears well-developed and well-nourished.       Cardiovascular:  Normal rate.      Exam reveals edema (trace BLE edema).        Pulmonary/Chest: Effort normal.        Abdominal: Soft. There is no abdominal tenderness.   gravid                 Neurological: She is alert and oriented to person, place, and time.    Skin: Skin is warm and dry.    Psychiatric: She has a normal mood and affect. Her behavior is normal.       Cervical Exam:  Dilation:  7  Effacement:  80  Station: 1  Presentation: Vertex    IUPC attempted to be placed x 2, but this was not able to be placed due to the low position of the fetal head. FSE placed without complication.     Cervix on the left is slightly swollen. Will order Benadryl 12.5 mg IV x 1 for this.     Significant Labs:  Lab Results   Component Value  "Date    GROUPTRH A POS 2024    HEPBSAG Non-Reactive 2024       CBC:   Recent Labs   Lab 24  1219   WBC 9.31   RBC 3.99*   HGB 11.1*   HCT 34.6*      MCV 86.7   MCH 27.8   MCHC 32.1     CMP:   Recent Labs   Lab 24  1219   GLU 71*   CALCIUM 8.4   ALBUMIN 2.5*   PROT 6.1*   *   K 3.8   CO2 19*      BUN 9   CREATININE 0.67   ALKPHOS 255*   ALT 12   AST 36*   BILITOT 0.3     Sommer/Creat Ratio: No results for input(s): "UTPCR" in the last 48 hours.    Recent Labs   Lab 24  1242   COLORU Light-Yellow   SPECGRAV 1.017   PHUR 6.5   PROTEINUA 30*   BACTERIA Occasional*   NITRITE Negative   LEUKOCYTESUR Small*   UROBILINOGEN Normal     I have personallly reviewed all pertinent lab results from the last 24 hours.  Recent Lab Results       None            Assessment/Plan:     19 y.o. female  at 38w0d for:    Active Diagnoses:    Diagnosis Date Noted POA    PRINCIPAL PROBLEM:  Mild pre-eclampsia in third trimester [O14.03] 2024 Yes    Encounter for repeat ultrasound of fetal pyelectasis, antepartum [O35.EXX0] 2024 Not Applicable    37 weeks gestation of pregnancy [Z3A.37] 2024 Not Applicable      Problems Resolved During this Admission:       Continue with IOL.   Anticipate .  Monitor for acute changes.   Monitor Bps closely.    Micky Garcia MD  Obstetrics  [facility] -  Labor and Delivery        "

## 2024-12-11 NOTE — PLAN OF CARE
Problem:  Fall Injury Risk  Goal: Absence of Fall, Infant Drop and Related Injury  Outcome: Progressing     Problem: Adult Inpatient Plan of Care  Goal: Plan of Care Review  Outcome: Progressing  Goal: Patient-Specific Goal (Individualized)  Outcome: Progressing  Goal: Absence of Hospital-Acquired Illness or Injury  Outcome: Progressing  Goal: Optimal Comfort and Wellbeing  Outcome: Progressing  Goal: Readiness for Transition of Care  Outcome: Progressing     Problem: Infection  Goal: Absence of Infection Signs and Symptoms  Outcome: Progressing     Problem: Hypertensive Disorders in Pregnancy  Goal: Patient-Fetal Stabilization  Outcome: Progressing     Problem: Postpartum (Vaginal Delivery)  Goal: Successful Parent Role Transition  Outcome: Progressing  Goal: Hemostasis  Outcome: Progressing  Goal: Absence of Infection Signs and Symptoms  Outcome: Progressing  Goal: Anesthesia/Sedation Recovery  Outcome: Progressing  Goal: Optimal Pain Control and Function  Outcome: Progressing  Goal: Effective Urinary Elimination  Outcome: Progressing

## 2024-12-12 LAB
ALBUMIN SERPL BCP-MCNC: 2 G/DL (ref 3.5–5)
ALBUMIN/GLOB SERPL: 0.7 {RATIO}
ALP SERPL-CCNC: 194 U/L (ref 40–150)
ALT SERPL W P-5'-P-CCNC: 12 U/L
ANION GAP SERPL CALCULATED.3IONS-SCNC: 11 MMOL/L (ref 7–16)
AST SERPL W P-5'-P-CCNC: 37 U/L (ref 5–34)
BASOPHILS # BLD AUTO: 0.04 K/UL (ref 0–0.2)
BASOPHILS NFR BLD AUTO: 0.3 % (ref 0–1)
BILIRUB SERPL-MCNC: 0.2 MG/DL
BUN SERPL-MCNC: 13 MG/DL (ref 7–19)
BUN/CREAT SERPL: 19 (ref 6–20)
CALCIUM SERPL-MCNC: 8.1 MG/DL (ref 8.4–10.2)
CHLORIDE SERPL-SCNC: 108 MMOL/L (ref 98–107)
CO2 SERPL-SCNC: 24 MMOL/L (ref 22–29)
CREAT SERPL-MCNC: 0.7 MG/DL (ref 0.55–1.02)
DIFFERENTIAL METHOD BLD: ABNORMAL
EGFR (NO RACE VARIABLE) (RUSH/TITUS): 127 ML/MIN/1.73M2
EOSINOPHIL # BLD AUTO: 0.01 K/UL (ref 0–0.5)
EOSINOPHIL NFR BLD AUTO: 0.1 % (ref 1–4)
ERYTHROCYTE [DISTWIDTH] IN BLOOD BY AUTOMATED COUNT: 12.5 % (ref 11.5–14.5)
GLOBULIN SER-MCNC: 3 G/DL (ref 2–4)
GLUCOSE SERPL-MCNC: 67 MG/DL (ref 74–100)
HCT VFR BLD AUTO: 30.2 % (ref 38–47)
HGB BLD-MCNC: 9.5 G/DL (ref 12–16)
IMM GRANULOCYTES # BLD AUTO: 0.04 K/UL (ref 0–0.04)
IMM GRANULOCYTES NFR BLD: 0.3 % (ref 0–0.4)
LYMPHOCYTES # BLD AUTO: 3.26 K/UL (ref 1–4.8)
LYMPHOCYTES NFR BLD AUTO: 24.3 % (ref 27–41)
MCH RBC QN AUTO: 27.8 PG (ref 27–31)
MCHC RBC AUTO-ENTMCNC: 31.5 G/DL (ref 32–36)
MCV RBC AUTO: 88.3 FL (ref 80–96)
MONOCYTES # BLD AUTO: 1.12 K/UL (ref 0–0.8)
MONOCYTES NFR BLD AUTO: 8.4 % (ref 2–6)
MPC BLD CALC-MCNC: 11.7 FL (ref 9.4–12.4)
NEUTROPHILS # BLD AUTO: 8.94 K/UL (ref 1.8–7.7)
NEUTROPHILS NFR BLD AUTO: 66.6 % (ref 53–65)
NRBC # BLD AUTO: 0 X10E3/UL
NRBC, AUTO (.00): 0 %
PLATELET # BLD AUTO: 232 K/UL (ref 150–400)
POTASSIUM SERPL-SCNC: 4.1 MMOL/L (ref 3.5–5.1)
PROT SERPL-MCNC: 5 G/DL (ref 6.4–8.3)
RBC # BLD AUTO: 3.42 M/UL (ref 4.2–5.4)
SODIUM SERPL-SCNC: 139 MMOL/L (ref 136–145)
WBC # BLD AUTO: 13.41 K/UL (ref 4.5–11)

## 2024-12-12 PROCEDURE — 11000001 HC ACUTE MED/SURG PRIVATE ROOM

## 2024-12-12 PROCEDURE — 80053 COMPREHEN METABOLIC PANEL: CPT | Performed by: OBSTETRICS & GYNECOLOGY

## 2024-12-12 PROCEDURE — 25000003 PHARM REV CODE 250: Performed by: OBSTETRICS & GYNECOLOGY

## 2024-12-12 PROCEDURE — 85025 COMPLETE CBC W/AUTO DIFF WBC: CPT | Performed by: OBSTETRICS & GYNECOLOGY

## 2024-12-12 PROCEDURE — 36415 COLL VENOUS BLD VENIPUNCTURE: CPT | Performed by: OBSTETRICS & GYNECOLOGY

## 2024-12-12 RX ADMIN — OXYCODONE HYDROCHLORIDE AND ACETAMINOPHEN 1 TABLET: 5; 325 TABLET ORAL at 07:12

## 2024-12-12 RX ADMIN — NIFEDIPINE 30 MG: 30 TABLET, FILM COATED, EXTENDED RELEASE ORAL at 09:12

## 2024-12-12 RX ADMIN — DOCUSATE SODIUM 200 MG: 100 CAPSULE, LIQUID FILLED ORAL at 09:12

## 2024-12-12 RX ADMIN — IBUPROFEN 600 MG: 600 TABLET, FILM COATED ORAL at 05:12

## 2024-12-12 RX ADMIN — HYDROCORTISONE 2.5%: 25 CREAM TOPICAL at 09:12

## 2024-12-12 RX ADMIN — IBUPROFEN 600 MG: 600 TABLET, FILM COATED ORAL at 07:12

## 2024-12-12 RX ADMIN — Medication 1 TABLET: at 09:12

## 2024-12-12 NOTE — PROGRESS NOTES
[facility] -  Labor and Delivery  Obstetrics  Postpartum Progress Note    Patient Name: Merle Hinojosa  MRN: [id number]  Admission Date: 2024  Hospital Length of Stay: 3 days  Attending Physician: Micky Garcia MD  Primary Care Provider: Danielle, Primary Doctor    Subjective:     Principal Problem:Mild pre-eclampsia in third trimester    Hospital course: s/p , PPD#1, stable.    Interval History: Bps have been wnl on Procardia XL 30 mg daily. Labs wnl. Asymptomatic--No HA, RUQ pain, vision changes.     She is doing well this morning. She is tolerating a regular diet without nausea or vomiting. She is voiding spontaneously. She is ambulating. She has passed flatus, and has not a BM. Vaginal bleeding is mild. She denies fever or chills. Abdominal pain is mild and controlled with oral medications. She Is breastfeeding. She desires circumcision for her male baby: yes.     Objective:     Vital Signs (Most Recent):  Temp: 98.2 °F (36.8 °C) (24 07)  Pulse: 106 (24 07)  Resp: 18 (24 0728)  BP: 125/68 (24 07)  SpO2: 99 % (24 193) Vital Signs (24h Range):  Temp:  [97.5 °F (36.4 °C)-98.2 °F (36.8 °C)] 98.2 °F (36.8 °C)  Pulse:  [] 106  Resp:  [18] 18  SpO2:  [97 %-99 %] 99 %  BP: (111-127)/(64-80) 125/68     Weight: 58.9 kg (129 lb 12.8 oz)  Body mass index is 21.6 kg/m².    No intake or output data in the 24 hours ending 24 1127    Physical Exam:   Constitutional: She is oriented to person, place, and time. She appears well-developed and well-nourished.       Cardiovascular:  Normal rate.      Exam reveals no edema.        Pulmonary/Chest: Effort normal.        Abdominal: Soft. There is no abdominal tenderness.   FF                 Neurological: She is alert and oriented to person, place, and time.    Skin: Skin is warm and dry.    Psychiatric: She has a normal mood and affect. Her behavior is normal.       Significant Labs:  Lab Results   Component Value Date     GROUPTRH A POS 2024    HEPBSAG Non-Reactive 2024     Recent Labs   Lab 24   HGB 9.5*   HCT 30.2*       CBC:   Recent Labs   Lab 24   WBC 13.41*   RBC 3.42*   HGB 9.5*   HCT 30.2*      MCV 88.3   MCH 27.8   MCHC 31.5*     CMP:   Recent Labs   Lab 24   GLU 67*   CALCIUM 8.1*   ALBUMIN 2.0*   PROT 5.0*      K 4.1   CO2 24   *   BUN 13   CREATININE 0.70   ALKPHOS 194*   ALT 12   AST 37*   BILITOT 0.2     I have personallly reviewed all pertinent lab results from the last 24 hours.  Recent Lab Results         24        Albumin/Globulin Ratio 0.7       Albumin 2.0              ALT 12       Anion Gap 11       AST 37       Baso # 0.04       Basophil % 0.3       BILIRUBIN TOTAL 0.2       BUN 13       BUN/CREAT RATIO 19       Calcium 8.1       Chloride 108       CO2 24       Creatinine 0.70       Differential Method Auto       eGFR 127       Eos # 0.01       Eos % 0.1       Globulin, Total 3.0       Glucose 67       Hematocrit 30.2       Hemoglobin 9.5       Immature Grans (Abs) 0.04       Immature Granulocytes 0.3       Lymph # 3.26       Lymph % 24.3       MCH 27.8       MCHC 31.5       MCV 88.3       Mono # 1.12       Mono % 8.4       MPV 11.7       Neutrophils, Abs 8.94       Neutrophils Relative 66.6       nRBC 0.0       NUCLEATED RBC ABSOLUTE 0.00       Platelet Count 232       Potassium 4.1       PROTEIN TOTAL 5.0       RBC 3.42       RDW 12.5       Sodium 139       WBC 13.41               Assessment/Plan:     19 y.o. female  at 38w0d for:    Active Diagnoses:    Diagnosis Date Noted POA    PRINCIPAL PROBLEM:  Mild pre-eclampsia in third trimester [O14.03] 2024 Yes    Encounter for repeat ultrasound of fetal pyelectasis, antepartum [O35.EXX0] 2024 Not Applicable    37 weeks gestation of pregnancy [Z3A.37] 2024 Not Applicable      Problems Resolved During this Admission:       Routine postpartum care.  Continue  Procardia XL 30 mg daily.  Monitor for acute changes.  Breast feeding support.  Plan to d/c home tomorrow as long as BP remains wnl.    Disposition: As patient meets milestones, will plan to discharge tomorrow.    Micky Garcia MD  Obstetrics  [facility] -  Labor and Delivery

## 2024-12-12 NOTE — LACTATION NOTE
This note was copied from a baby's chart.  Breastfeeding rounds done, mom reports infant latching for some feeds for a short amount of time, mom attempting to put infant to breast every feed, supplementing after, mom encouraged to pump for feeds when infant is not latching well, mom denies questions or concerns, mom to call with any needs

## 2024-12-12 NOTE — PLAN OF CARE
Problem:  Fall Injury Risk  Goal: Absence of Fall, Infant Drop and Related Injury  2024 by Haven Prince RN  Outcome: Progressing  2024 by Haven Prince RN  Outcome: Progressing     Problem: Adult Inpatient Plan of Care  Goal: Plan of Care Review  2024 by Haven Prince, RN  Outcome: Progressing  2024 by Haven Prince RN  Outcome: Progressing  Goal: Patient-Specific Goal (Individualized)  2024 by Haven Prince RN  Outcome: Progressing  2024 by Haven Prince RN  Outcome: Progressing  Goal: Absence of Hospital-Acquired Illness or Injury  2024 by Haven Prince, RN  Outcome: Progressing  2024 by Haven Prince RN  Outcome: Progressing  Goal: Optimal Comfort and Wellbeing  2024 by Haven Prince, RN  Outcome: Progressing  2024 by Haven Prince RN  Outcome: Progressing  Goal: Readiness for Transition of Care  2024 by Haven Prince RN  Outcome: Progressing  2024 by Haven Prince RN  Outcome: Progressing     Problem: Infection  Goal: Absence of Infection Signs and Symptoms  2024 by Haven Prince, RN  Outcome: Progressing  2024 by Haven Prince RN  Outcome: Progressing     Problem: Hypertensive Disorders in Pregnancy  Goal: Patient-Fetal Stabilization  2024 by Haven Prince RN  Outcome: Progressing  2024 by Haven Prince RN  Outcome: Progressing     Problem: Postpartum (Vaginal Delivery)  Goal: Successful Parent Role Transition  2024 by Haven Prince, RN  Outcome: Progressing  2024 by Haven Prince RN  Outcome: Progressing  Goal: Hemostasis  2024 by Haven Prince, RN  Outcome: Progressing  2024 by Haven Prince, RN  Outcome: Progressing  Goal: Absence of Infection Signs  and Symptoms  12/12/2024 0717 by Haven Prince, RN  Outcome: Progressing  12/11/2024 1832 by Haven Prince, RN  Outcome: Progressing  Goal: Anesthesia/Sedation Recovery  12/12/2024 0717 by Haven Prince, RN  Outcome: Progressing  12/11/2024 1832 by Haven Prince, RN  Outcome: Progressing  Goal: Optimal Pain Control and Function  12/12/2024 0717 by Haven Prince, RN  Outcome: Progressing  12/11/2024 1832 by Haven Prince RN  Outcome: Progressing  Goal: Effective Urinary Elimination  12/12/2024 0717 by Haven Prince, RN  Outcome: Progressing  12/11/2024 1832 by Haven Prince, RN  Outcome: Progressing

## 2024-12-12 NOTE — LACTATION NOTE
This note was copied from a baby's chart.  To moms room to assist with breastfeeding, infant in skin to skin, good positioning, infant sleeping with no signs of hunger cues noted, mom encouraged to continue offering breast every feed, and to call for assistance as needed   Well Child Examination    Torsten Mendoza is a 2 year old male who presents for 2 yr old well child exam.  Patient presents with Father and Mother.    Concerns raised today include: none  Recent illnesses/injuries:  None  Diet: Overall--good eater   Milk--good dairy,   Vegetables--fair   Fruit--good   Meat/Protein--good  Elimination:  {No issues; Potty trained:  no - working on it  Sleep:  no issues and sleeps through the night  Development:washes and dries hands, imitates vertical line, throws ball overhand, and pedals tricycle    Birth history, medical history, surgical history, family history, medications and allergies reviewed and updated.    ROS:   Review of systems was negative with the exception of the history noted above.       PHYSICAL EXAM:  Blood pressure 90/56, height 3' 2\" (0.965 m), weight 15.7 kg (34 lb 9.6 oz).   Body mass index is 16.85 kg/m². 75.0 %ile (Z= 0.68) based on CDC (Boys, 2-20 Years) BMI-for-age based on BMI available as of 3/18/2024.  GENERAL:  Well appearing  male child, no acute distress.  Alert and interactive.  SKIN: No rashes or lesions.   HEAD:  Atraumatic, normocephalic.    EYES:  Pupils equal, round, and reactive to light bilaterally. Conjunctiva non-injected.  EARS:  TMs (tympanic membranes) are transparent with normal landmarks.  NOSE:  Nares are patent.   THROAT:  Oropharynx and mucous membranes are moist. Tonsils are normal in size and appearance.   NECK: Supple without lymphadenopathy or masses.  Normal range of motion.    LUNGS: Clear to auscultation bilaterally. No wheezes, rales, or rhonchi.  HEART: Regular rate and rhythm without murmurs, rubs, or gallops.  ABDOMEN: Soft, nontender.  Normal bowel sounds. No hepatosplenomegaly.  :  Sen 1 male and Testes descended bilaterally.   MSK: Normal range of motion.    NEUROLOGIC:  Normal tone, bulk, strength.    ASSESSMENT:  2 year old male well child.    PLAN:    All parental concerns and questions  discussed.    Anticipatory guidance provided, handout given.              Safety/car/bicycle/fire/sharp objects/falls/water              Development              Discipline              Diet              Analgesics/antipyretics              Sun exposure / insect repellent              Tobacco-free home              Dental care      RTC in 1 year for WCE or sooner prn illness/concerns.

## 2024-12-13 ENCOUNTER — LACTATION ENCOUNTER (OUTPATIENT)
Dept: OBSTETRICS AND GYNECOLOGY | Facility: HOSPITAL | Age: 19
End: 2024-12-13

## 2024-12-13 VITALS
HEART RATE: 77 BPM | RESPIRATION RATE: 20 BRPM | SYSTOLIC BLOOD PRESSURE: 135 MMHG | BODY MASS INDEX: 21.63 KG/M2 | TEMPERATURE: 97 F | HEIGHT: 65 IN | WEIGHT: 129.81 LBS | DIASTOLIC BLOOD PRESSURE: 87 MMHG | OXYGEN SATURATION: 98 %

## 2024-12-13 PROCEDURE — 25000003 PHARM REV CODE 250: Performed by: OBSTETRICS & GYNECOLOGY

## 2024-12-13 RX ORDER — PRENATAL WITH FERROUS FUM AND FOLIC ACID 3080; 920; 120; 400; 22; 1.84; 3; 20; 10; 1; 12; 200; 27; 25; 2 [IU]/1; [IU]/1; MG/1; [IU]/1; MG/1; MG/1; MG/1; MG/1; MG/1; MG/1; UG/1; MG/1; MG/1; MG/1; MG/1
1 TABLET ORAL DAILY
Qty: 30 TABLET | Refills: 11 | Status: SHIPPED | OUTPATIENT
Start: 2024-12-14 | End: 2025-12-14

## 2024-12-13 RX ORDER — NIFEDIPINE 30 MG/1
30 TABLET, EXTENDED RELEASE ORAL DAILY
Qty: 30 TABLET | Refills: 11 | Status: SHIPPED | OUTPATIENT
Start: 2024-12-14 | End: 2024-12-17

## 2024-12-13 RX ORDER — IBUPROFEN 600 MG/1
600 TABLET ORAL EVERY 6 HOURS PRN
Qty: 30 TABLET | Refills: 1 | Status: SHIPPED | OUTPATIENT
Start: 2024-12-13

## 2024-12-13 RX ADMIN — Medication 1 TABLET: at 08:12

## 2024-12-13 RX ADMIN — NIFEDIPINE 30 MG: 30 TABLET, FILM COATED, EXTENDED RELEASE ORAL at 08:12

## 2024-12-13 RX ADMIN — OXYCODONE HYDROCHLORIDE AND ACETAMINOPHEN 1 TABLET: 5; 325 TABLET ORAL at 04:12

## 2024-12-13 NOTE — PLAN OF CARE
Problem:  Fall Injury Risk  Goal: Absence of Fall, Infant Drop and Related Injury  2024 1030 by Richelle Ye, RN  Outcome: Met  2024 0736 by Richelle Ye RN  Outcome: Progressing     Problem: Adult Inpatient Plan of Care  Goal: Plan of Care Review  2024 1030 by Richelle Ye, RN  Outcome: Met  2024 0736 by Richelle Ye RN  Outcome: Progressing  Goal: Patient-Specific Goal (Individualized)  2024 1030 by Richelle Ye RN  Outcome: Met  2024 0736 by Richelle Ye RN  Outcome: Progressing  Goal: Absence of Hospital-Acquired Illness or Injury  2024 1030 by Richelle Ye RN  Outcome: Met  2024 0736 by Richelle Ye, RN  Outcome: Progressing  Goal: Optimal Comfort and Wellbeing  2024 1030 by Richelle Ye RN  Outcome: Met  2024 0736 by Richelle Ye RN  Outcome: Progressing  Goal: Readiness for Transition of Care  2024 1030 by Richelle Ye RN  Outcome: Met  2024 0736 by Richelle Ye RN  Outcome: Progressing     Problem: Infection  Goal: Absence of Infection Signs and Symptoms  2024 1030 by Richelle Ye, RN  Outcome: Met  2024 0736 by Richelle Ye, RN  Outcome: Progressing     Problem: Hypertensive Disorders in Pregnancy  Goal: Patient-Fetal Stabilization  2024 1030 by Richelle Ye, RN  Outcome: Met  2024 0736 by Richelle Ye RN  Outcome: Progressing     Problem: Postpartum (Vaginal Delivery)  Goal: Successful Parent Role Transition  2024 1030 by Richelle Ye, RN  Outcome: Met  2024 0736 by Richelle Ye, RN  Outcome: Progressing  Goal: Hemostasis  2024 1030 by Richelle Ye, RN  Outcome: Met  2024 0736 by Richelle Ye, RN  Outcome: Progressing  Goal: Absence of Infection Signs and Symptoms  2024 1030 by Richelle Ye, RN  Outcome: Met  2024 0736 by Richelle Ye, RN  Outcome: Progressing  Goal:  Anesthesia/Sedation Recovery  12/13/2024 1030 by Richelle Ye, RN  Outcome: Met  12/13/2024 0736 by Richelle Ye, RN  Outcome: Progressing  Goal: Optimal Pain Control and Function  12/13/2024 1030 by Richelle Ye, RN  Outcome: Met  12/13/2024 0736 by Richelle Ye, RN  Outcome: Progressing  Goal: Effective Urinary Elimination  12/13/2024 1030 by Richelle Ye, RN  Outcome: Met  12/13/2024 0736 by Richelle Ye, RN  Outcome: Progressing

## 2024-12-13 NOTE — PROGRESS NOTES
[facility] -  Labor and Delivery  Obstetrics  Postpartum Progress Note    Patient Name: Merle Hinojosa  MRN: [id number]  Admission Date: 2024  Hospital Length of Stay: 4 days  Attending Physician: Micky Garcia MD  Primary Care Provider: Danielle, Primary Doctor    Subjective:     Principal Problem:Mild pre-eclampsia in third trimester    Hospital course: s/p , PPD#2, stable.    Interval History: Bps have been wnl on Procardia XL 30 mg daily. Labs wnl. Asymptomatic--No HA, RUQ pain, vision changes. She is ready to be discharged home today.    She is doing well this morning. She is tolerating a regular diet without nausea or vomiting. She is voiding spontaneously. She is ambulating. She has passed flatus, and has not a BM. Vaginal bleeding is mild. She denies fever or chills. Abdominal pain is mild and controlled with oral medications. She Is breastfeeding. She desires circumcision for her male baby: yes.      Objective:     Vital Signs (Most Recent):  Temp: 97.4 °F (36.3 °C) (24)  Pulse: 77 (24)  Resp: 20 (24)  BP: 135/87 (24)  SpO2: 98 % (24) Vital Signs (24h Range):  Temp:  [96.7 °F (35.9 °C)-97.8 °F (36.6 °C)] 97.4 °F (36.3 °C)  Pulse:  [73-98] 77  Resp:  [18-20] 20  SpO2:  [98 %-99 %] 98 %  BP: (103-135)/(55-89) 135/87     Weight: 58.9 kg (129 lb 12.8 oz)  Body mass index is 21.6 kg/m².    No intake or output data in the 24 hours ending 24 09    Physical Exam:   Constitutional: She is oriented to person, place, and time. She appears well-developed and well-nourished.       Cardiovascular:  Normal rate.      Exam reveals edema (2+ BLE edema).        Pulmonary/Chest: Effort normal.        Abdominal: Soft. There is no abdominal tenderness.     FF                 Neurological: She is alert and oriented to person, place, and time.    Skin: Skin is warm and dry.    Psychiatric: She has a normal mood and affect. Her behavior is normal.        Significant Labs:  Lab Results   Component Value Date    GROUPTRH A POS 2024    HEPBSAG Non-Reactive 2024     Recent Labs   Lab 24   HGB 9.5*   HCT 30.2*       CBC:   Recent Labs   Lab 24   WBC 13.41*   RBC 3.42*   HGB 9.5*   HCT 30.2*      MCV 88.3   MCH 27.8   MCHC 31.5*     CMP:   Recent Labs   Lab 24   GLU 67*   CALCIUM 8.1*   ALBUMIN 2.0*   PROT 5.0*      K 4.1   CO2 24   *   BUN 13   CREATININE 0.70   ALKPHOS 194*   ALT 12   AST 37*   BILITOT 0.2     I have personallly reviewed all pertinent lab results from the last 24 hours.    Assessment/Plan:     19 y.o. female  at 38w0d for:    Active Diagnoses:    Diagnosis Date Noted POA    PRINCIPAL PROBLEM:  Mild pre-eclampsia in third trimester [O14.03] 2024 Yes    Encounter for repeat ultrasound of fetal pyelectasis, antepartum [O35.EXX0] 2024 Not Applicable    37 weeks gestation of pregnancy [Z3A.37] 2024 Not Applicable      Problems Resolved During this Admission:       Routine postpartum care.   Bps stable on Procardia XL 30 mg daily.   Stable to d/c home.  F/u in 1 week for BP check.    Preeclampsia/Eclampsia briefing   The patient was informed of elevated BP during/after delivery(preeclampsia)   Treatment plan (medications to control BP, vital signs every 4 hours, monitor for signs/symptoms-headache that does not get better after taking medication, vision changes, swelling of hands or face pain in upper abdomen or shoulder, nausea/vomiting, and/or shortness of breath, confusion, or anxiety)   All patients questions/concerns have been answered and addressed with patient.     Disposition: As patient meets milestones, will plan to discharge today.    Micky Garcia MD  Obstetrics  [facility] -  Labor and Delivery

## 2024-12-13 NOTE — DISCHARGE SUMMARY
[facility] -  Labor and Delivery  Obstetrics  Discharge Summary      Patient Name: Merle Hinojosa  MRN: [id number]  Admission Date: 2024  Hospital Length of Stay: 4 days  Discharge Date and Time:  2024 9:18 AM  Attending Physician: Micky Garcia MD   Discharging Provider: Micky Garcia MD  Primary Care Provider: Danielle, Primary Doctor    HPI: 20 yo  c IUP @ 37+6 weeks was a direct admit from clinic yesterday afternoon. She had elevated blood pressures in the 140s/100s. Her Bps the last 2 weeks have been 130s-140s/80s-90s. She was diagnosed with Gestational HTN and was planned to be induced on 2024. However, due to increasingly elevated blood pressures, the decision was made to admit her and deliver her on 2024. Also she was having more facial edema.   She denies any HA, RUQ pain, vision changes.   Her US on 2024: vtx, DESTINY 14 cm, BPP 8/8.  Her growth US at [facility] on 2024 (due to fetal pyelectasis) showed at EFW of 6 lbs 9 oz (40%), bilateral fetal pelvis dilation noted.    * No surgery found *     Hospital Course: She underwent a Cytotec induction, then Cook's catheter and Pitocin. She had an epidural for anesthesia. She progressed and had a normal spontaneous vaginal delivery and delivered a viable male infant without complication on 2024.   After delivery her Bps increased and she was started on Procardia XL 30 mg daily and her Bps stabilized.   She never had severe symptoms, abnormal labs, or severe range Bps. Therefore, she was not given Mag sulfate postpartum.   On POD#3 she was deemed stable for discharge home.    Consults (From admission, onward)          Status Ordering Provider     Inpatient consult to Lactation  Once        Provider:  (Not yet assigned)    Acknowledged MICKY GARCIA     Inpatient consult to Anesthesiology  Once        Provider:  (Not yet assigned)    Acknowledged MICKY GARCIA            Final Active Diagnoses:    Diagnosis Date Noted POA  "   PRINCIPAL PROBLEM:  Postpartum care and examination immediately after delivery [Z39.0] 2024 Not Applicable    Mild pre-eclampsia in third trimester [O14.03] 2024 Yes    Encounter for repeat ultrasound of fetal pyelectasis, antepartum [O35.EXX0] 2024 Not Applicable    37 weeks gestation of pregnancy [Z3A.37] 2024 Not Applicable      Problems Resolved During this Admission:        Labs: CMP   Recent Labs   Lab 24  0354      K 4.1   *   CO2 24   GLU 67*   BUN 13   CREATININE 0.70   CALCIUM 8.1*   PROT 5.0*   ALBUMIN 2.0*   BILITOT 0.2   ALKPHOS 194*   AST 37*   ALT 12   ANIONGAP 11    and CBC   Recent Labs   Lab 24   WBC 13.41*   HGB 9.5*   HCT 30.2*          Feeding Method: both breast and bottle    Immunizations       Date Immunization Status Dose Route/Site Given by    24 MMR Incomplete 0.5 mL Subcutaneous/     2427 Tdap Incomplete 0.5 mL Intramuscular/             Delivery:    Episiotomy: None   Lacerations: 1st   Repair suture: None   Repair # of packets: 1   Blood loss (ml):       Birth information:  YOB: 2025   Time of birth: 4:12 AM   Sex: male   Delivery type: Vaginal, Spontaneous   Gestational Age: 38w0d     Measurements    Weight: 2984 g  Weight (lbs): 6 lb 9.3 oz  Length: 49.5 cm  Length (in): 19.5"         Delivery Clinician: Delivery Providers    Delivering clinician: Micky Garcia MD          Additional  information:  Forceps:    Vacuum:    Breech:    Observed anomalies      Living?:     Apgars    Living status: Living  Apgar Component Scores:  1 min.:  5 min.:  10 min.:  15 min.:  20 min.:    Skin color:  0  1       Heart rate:  2  2       Reflex irritability:  2  2       Muscle tone:  2  2       Respiratory effort:  2  2       Total:  8  9       Apgars assigned by: CHINYERE ALFARO NICU RN         Placenta: Delivered:       appearance  Pending Diagnostic Studies:       None            Discharged Condition: " good    Disposition: Home or Self Care    Follow Up:   Follow-up Information       Micky Garcia MD Follow up in 1 week(s).    Specialty: Obstetrics and Gynecology  Why: BP check and postpartum visit.  Contact information:  [address] [facility] [place] [address]  [phone]                           Patient Instructions:      Diet Adult Regular     Pelvic Rest   Order Comments: X 6 weeks     Notify your health care provider if you experience any of the following:  temperature >100.4     Notify your health care provider if you experience any of the following:  persistent nausea and vomiting or diarrhea     Notify your health care provider if you experience any of the following:  severe uncontrolled pain     Notify your health care provider if you experience any of the following:  difficulty breathing or increased cough     Notify your health care provider if you experience any of the following:  severe persistent headache     Notify your health care provider if you experience any of the following:  worsening rash     Notify your health care provider if you experience any of the following:  persistent dizziness, light-headedness, or visual disturbances     Notify your health care provider if you experience any of the following:  increased confusion or weakness     Notify your health care provider if you experience any of the following:   Order Comments: Vaginal bleeding > 1 pad per hour, foul smelling vaginal discharge, severe HA, RUQ pain, vision changes, mood changes, or any other acute changes.     Activity as tolerated     Medications:  Current Discharge Medication List        START taking these medications    Details   ibuprofen (ADVIL,MOTRIN) 600 MG tablet Take 1 tablet (600 mg total) by mouth every 6 (six) hours as needed (cramping).  Qty: 30 tablet, Refills: 1      NIFEdipine (PROCARDIA-XL) 30 MG (OSM) 24 hr tablet Take 1 tablet (30 mg total) by mouth once daily.  Qty: 30 tablet, Refills: 11    Comments: .       PNV,calcium 72/iron/folic acid (PRENATAL VITAMIN) Tab Take 1 tablet by mouth once daily.  Qty: 30 tablet, Refills: 11           CONTINUE these medications which have NOT CHANGED    Details   EPINEPHrine (EPIPEN JR) 0.15 mg/0.3 mL pen injection Inject 0.15 mg into the muscle as needed for Anaphylaxis.           STOP taking these medications       ondansetron (ZOFRAN) 4 MG tablet Comments:   Reason for Stopping:               Micky Garcia MD  Obstetrics  [facility] -  Labor and Delivery

## 2024-12-13 NOTE — LACTATION NOTE
This note was copied from a baby's chart.  Breastfeeding rounds done, mom reports infant latching well for a couple minutes each time, supplementing, encouraged mom to continue putting infant to breast every feed and to pump if feeding is less than 15 minutes, mom also advised to supplement a min of 20 ml of formula after each feed per providers recommendations. mom denies questions or concerns, mom to call with any needs

## 2024-12-16 LAB
ESTROGEN SERPL-MCNC: NORMAL PG/ML
INSULIN SERPL-ACNC: NORMAL U[IU]/ML
LAB AP CLINICAL INFORMATION: NORMAL
LAB AP GESTATIONAL AGE: NORMAL
LAB AP GROSS DESCRIPTION: NORMAL
LAB AP LABORATORY NOTES: NORMAL
T3RU NFR SERPL: NORMAL %

## 2024-12-17 ENCOUNTER — POSTPARTUM VISIT (OUTPATIENT)
Dept: OBSTETRICS AND GYNECOLOGY | Facility: CLINIC | Age: 19
End: 2024-12-17
Payer: MEDICAID

## 2024-12-17 VITALS
DIASTOLIC BLOOD PRESSURE: 100 MMHG | HEART RATE: 87 BPM | SYSTOLIC BLOOD PRESSURE: 145 MMHG | WEIGHT: 120 LBS | BODY MASS INDEX: 19.99 KG/M2 | HEIGHT: 65 IN

## 2024-12-17 DIAGNOSIS — I10 HYPERTENSION, UNSPECIFIED TYPE: Primary | ICD-10-CM

## 2024-12-17 PROCEDURE — 0503F POSTPARTUM CARE VISIT: CPT | Mod: CPTII,,, | Performed by: OBSTETRICS & GYNECOLOGY

## 2024-12-17 PROCEDURE — 99999 PR PBB SHADOW E&M-EST. PATIENT-LVL III: CPT | Mod: PBBFAC,,, | Performed by: OBSTETRICS & GYNECOLOGY

## 2024-12-17 PROCEDURE — 99213 OFFICE O/P EST LOW 20 MIN: CPT | Mod: PBBFAC | Performed by: OBSTETRICS & GYNECOLOGY

## 2024-12-17 RX ORDER — NIFEDIPINE 60 MG/1
60 TABLET, EXTENDED RELEASE ORAL DAILY
Qty: 30 TABLET | Refills: 11 | Status: SHIPPED | OUTPATIENT
Start: 2024-12-17 | End: 2024-12-17

## 2024-12-17 RX ORDER — NIFEDIPINE 60 MG/1
60 TABLET, EXTENDED RELEASE ORAL DAILY
Qty: 30 TABLET | Refills: 11 | Status: SHIPPED | OUTPATIENT
Start: 2024-12-17 | End: 2025-12-17

## 2024-12-17 NOTE — PROGRESS NOTES
Postpartum Visit  Merle Hinojosa is a 19 y.o. female  is here for a postpartum visit. She is 1 weeks postpartum following a spontaneous vaginal delivery, of a male infant weighinlb 9.3oz, with Anesthesia: epidural. . The delivery was at 38w 0d.     Pregnancy was complicated by: GHTN.      Her BP today is elevated at 145/100. Her blood pressure was controlled in the hospital with Procardia XL 30 mg daily. Will increase to 60 mg daily.   Denies any HA, RUQ pain, vision changes.    Postpartum course has been uncomplicated.  Bleeding thin lochia. Bowel/ bladder function is normal. Her last pap was n/a.  Patient is not sexually active. Desired contraception method is oral progesterone-only contraceptive.   Postpartum depression screening: negative.  Baby's course has been uncomplicated. Baby is feeding by breast.     OB History    Para Term  AB Living   1 1 1     1   SAB IAB Ectopic Multiple Live Births         0 1      # Outcome Date GA Lbr Rashid/2nd Weight Sex Type Anes PTL Lv   1 Term 24 38w0d / 02:43 2.984 kg (6 lb 9.3 oz) M Vag-Spont EPI Y TOREY         Review of patient's allergies indicates:   Allergen Reactions    Fire ant      ants    Venom-honey bee Edema    Wasp venom        No past medical history on file.  Past Surgical History:   Procedure Laterality Date    FRACTURE SURGERY Left     Repair of left pinky finger     OB History          1    Para   1    Term   1            AB        Living   1         SAB        IAB        Ectopic        Multiple   0    Live Births   1               Family History   Problem Relation Name Age of Onset    Cancer Paternal Aunt      Cancer Maternal Grandmother      Cancer Maternal Grandfather      Cancer Paternal Grandmother      No Known Problems Mother      No Known Problems Father      No Known Problems Sister      No Known Problems Brother      No Known Problems Sister       Social History     Tobacco Use    Smoking status: Every  Day     Types: Vaping with nicotine     Passive exposure: Current    Smokeless tobacco: Never    Tobacco comments:     Former cigarettes   Substance Use Topics    Alcohol use: Never    Drug use: Never       Current Outpatient Medications   Medication Sig    EPINEPHrine (EPIPEN JR) 0.15 mg/0.3 mL pen injection Inject 0.15 mg into the muscle as needed for Anaphylaxis.    ibuprofen (ADVIL,MOTRIN) 600 MG tablet Take 1 tablet (600 mg total) by mouth every 6 (six) hours as needed (cramping).    NIFEdipine (PROCARDIA-XL) 60 MG (OSM) 24 hr tablet Take 1 tablet (60 mg total) by mouth once daily.    PNV,calcium 72/iron/folic acid (PRENATAL VITAMIN) Tab Take 1 tablet by mouth once daily.     No current facility-administered medications for this visit.         ROS:  GENERAL: No fever, chills, fatigability.  VULVAR: No pain, no lesions and no itching.  VAGINAL: No relaxation, no itching, no discharge, no abnormal bleeding and no lesions.  ABDOMEN: No abdominal pain. Denies nausea. Denies vomiting. No diarrhea. No constipation  BREAST: Denies pain. No lumps. No discharge.  URINARY: No incontinence, no nocturia, no frequency and no dysuria.  CARDIOVASCULAR: No chest pain. No shortness of breath. No leg cramps.  NEUROLOGICAL: No headaches. No vision changes.      General appearance - alert, well appearing, and in no distress  Mental status - alert, oriented to person, place, and time  Skin - coloration normal for race, good turgor, warm to touch, no rashes  Abdomen - soft, nontender, nondistended, no masses or organomegaly  Extremities - no edema, redness or tenderness in the calves or thighs      Merle was seen today for postpartum care.    Diagnoses and all orders for this visit:    Hypertension, unspecified type  -     Discontinue: NIFEdipine (PROCARDIA-XL) 60 MG (OSM) 24 hr tablet; Take 1 tablet (60 mg total) by mouth once daily.  -     NIFEdipine (PROCARDIA-XL) 60 MG (OSM) 24 hr tablet; Take 1 tablet (60 mg total) by mouth  once daily.    Postpartum care and examination        Discussed contraception - pt desires POPs  Breast feeding support.  Pericare discussed.  Postpartum precautions reviewed  Increase Procardia XL to 60 mg daily. Pre-eclampsia precautions discussed.   BLE should resolve in the next week.    F/u in 2 weeks for BP check postpartum visit

## 2024-12-27 NOTE — PROGRESS NOTES
Postpartum Visit  Merle Hinojosa is a 19 y.o. female  is here for a postpartum visit. She is 3 weeks postpartum following a spontaneous vaginal delivery, of a male infant weighinlb 9.3oz, with Anesthesia: epidural. . The delivery was at 38w 0d.     Pregnancy was complicated by: Gestational Hypertension.        Postpartum course has been uncomplicated.  Bleeding thin lochia. Bowel/ bladder function is normal. She will be due for a PAP next year.  Patient is not sexually active. Desired contraception method is POPs.   Postpartum depression screening: negative.  Baby's course has been uncomplicated. Baby is feeding by breast.     OB History    Para Term  AB Living   1 1 1     1   SAB IAB Ectopic Multiple Live Births         0 1      # Outcome Date GA Lbr Rashid/2nd Weight Sex Type Anes PTL Lv   1 Term 24 38w0d / 02:43 2.984 kg (6 lb 9.3 oz) M Vag-Spont EPI Y TOREY         Review of patient's allergies indicates:   Allergen Reactions    Fire ant      ants    Venom-honey bee Edema    Wasp venom        History reviewed. No pertinent past medical history.  Past Surgical History:   Procedure Laterality Date    FRACTURE SURGERY Left     Repair of left pinky finger     OB History          1    Para   1    Term   1            AB        Living   1         SAB        IAB        Ectopic        Multiple   0    Live Births   1               Family History   Problem Relation Name Age of Onset    Cancer Paternal Aunt      Cancer Maternal Grandmother      Cancer Maternal Grandfather      Cancer Paternal Grandmother      No Known Problems Mother      No Known Problems Father      No Known Problems Sister      No Known Problems Brother      No Known Problems Sister       Social History     Tobacco Use    Smoking status: Every Day     Types: Vaping with nicotine     Passive exposure: Current    Smokeless tobacco: Never    Tobacco comments:     Former cigarettes   Substance Use Topics     Alcohol use: Never    Drug use: Never       Current Outpatient Medications   Medication Sig    EPINEPHrine (EPIPEN JR) 0.15 mg/0.3 mL pen injection Inject 0.15 mg into the muscle as needed for Anaphylaxis.    ibuprofen (ADVIL,MOTRIN) 600 MG tablet Take 1 tablet (600 mg total) by mouth every 6 (six) hours as needed (cramping).    NIFEdipine (PROCARDIA-XL) 60 MG (OSM) 24 hr tablet Take 1 tablet (60 mg total) by mouth once daily.    PNV,calcium 72/iron/folic acid (PRENATAL VITAMIN) Tab Take 1 tablet by mouth once daily.     No current facility-administered medications for this visit.         ROS:  GENERAL: No fever, chills, fatigability.  VULVAR: No pain, no lesions and no itching.  VAGINAL: No relaxation, no itching, no discharge, no abnormal bleeding and no lesions.  ABDOMEN: No abdominal pain. Denies nausea. Denies vomiting. No diarrhea. No constipation  BREAST: Denies pain. No lumps. No discharge.  URINARY: No incontinence, no nocturia, no frequency and no dysuria.  CARDIOVASCULAR: No chest pain. No shortness of breath. No leg cramps.  NEUROLOGICAL: No headaches. No vision changes.      General appearance - alert, well appearing, and in no distress  Mental status - alert, oriented to person, place, and time  Skin - coloration normal for race, good turgor, warm to touch, no rashes  Abdomen - soft, nontender, nondistended, no masses or organomegaly  Extremities - no edema, redness or tenderness in the calves or thighs      Merle was seen today for postpartum care.    Diagnoses and all orders for this visit:    Postpartum care and examination    Hypertension, unspecified type        Discussed contraception - pt desires POPs.  Breast feeding support.  Pericare discussed.  Postpartum precautions reviewed    F/u in 3 weeks for 6 week postpartum visit

## 2024-12-30 ENCOUNTER — POSTPARTUM VISIT (OUTPATIENT)
Dept: OBSTETRICS AND GYNECOLOGY | Facility: CLINIC | Age: 19
End: 2024-12-30
Payer: MEDICAID

## 2024-12-30 VITALS
WEIGHT: 114 LBS | HEART RATE: 113 BPM | HEIGHT: 65 IN | DIASTOLIC BLOOD PRESSURE: 83 MMHG | BODY MASS INDEX: 18.99 KG/M2 | SYSTOLIC BLOOD PRESSURE: 129 MMHG

## 2024-12-30 DIAGNOSIS — I10 HYPERTENSION, UNSPECIFIED TYPE: ICD-10-CM

## 2024-12-30 PROCEDURE — 99213 OFFICE O/P EST LOW 20 MIN: CPT | Mod: PBBFAC | Performed by: OBSTETRICS & GYNECOLOGY

## 2024-12-30 PROCEDURE — 99999 PR PBB SHADOW E&M-EST. PATIENT-LVL III: CPT | Mod: PBBFAC,,, | Performed by: OBSTETRICS & GYNECOLOGY

## 2024-12-30 PROCEDURE — 0503F POSTPARTUM CARE VISIT: CPT | Mod: CPTII,,, | Performed by: OBSTETRICS & GYNECOLOGY

## 2025-01-01 ENCOUNTER — PATIENT MESSAGE (OUTPATIENT)
Dept: OBSTETRICS AND GYNECOLOGY | Facility: CLINIC | Age: 20
End: 2025-01-01
Payer: MEDICAID

## 2025-01-06 ENCOUNTER — CLINICAL SUPPORT (OUTPATIENT)
Dept: OBSTETRICS AND GYNECOLOGY | Facility: CLINIC | Age: 20
End: 2025-01-06
Payer: MEDICAID

## 2025-01-06 VITALS — SYSTOLIC BLOOD PRESSURE: 105 MMHG | DIASTOLIC BLOOD PRESSURE: 67 MMHG

## 2025-01-06 DIAGNOSIS — I10 HYPERTENSION, UNSPECIFIED TYPE: Primary | ICD-10-CM

## 2025-01-06 PROCEDURE — 99213 OFFICE O/P EST LOW 20 MIN: CPT | Mod: PBBFAC

## 2025-01-06 PROCEDURE — 99999 PR PBB SHADOW E&M-EST. PATIENT-LVL III: CPT | Mod: PBBFAC,,,

## 2025-01-21 ENCOUNTER — POSTPARTUM VISIT (OUTPATIENT)
Dept: OBSTETRICS AND GYNECOLOGY | Facility: CLINIC | Age: 20
End: 2025-01-21
Payer: MEDICAID

## 2025-01-21 VITALS
BODY MASS INDEX: 18.11 KG/M2 | DIASTOLIC BLOOD PRESSURE: 80 MMHG | HEART RATE: 91 BPM | SYSTOLIC BLOOD PRESSURE: 127 MMHG | WEIGHT: 108.81 LBS

## 2025-01-21 DIAGNOSIS — N39.3 URINARY, INCONTINENCE, STRESS FEMALE: ICD-10-CM

## 2025-01-21 DIAGNOSIS — Z30.9 ENCOUNTER FOR CONTRACEPTIVE MANAGEMENT, UNSPECIFIED TYPE: Primary | ICD-10-CM

## 2025-01-21 LAB
B-HCG UR QL: NEGATIVE
CTP QC/QA: YES

## 2025-01-21 PROCEDURE — 0503F POSTPARTUM CARE VISIT: CPT | Mod: CPTII,,, | Performed by: OBSTETRICS & GYNECOLOGY

## 2025-01-21 PROCEDURE — 99999PBSHW PR PBB SHADOW TECHNICAL ONLY FILED TO HB: Mod: PBBFAC,,,

## 2025-01-21 PROCEDURE — 99213 OFFICE O/P EST LOW 20 MIN: CPT | Mod: PBBFAC | Performed by: OBSTETRICS & GYNECOLOGY

## 2025-01-21 PROCEDURE — 99999 PR PBB SHADOW E&M-EST. PATIENT-LVL III: CPT | Mod: PBBFAC,,, | Performed by: OBSTETRICS & GYNECOLOGY

## 2025-01-21 PROCEDURE — 99999PBSHW POCT URINE PREGNANCY: Mod: PBBFAC,,,

## 2025-01-21 PROCEDURE — 96372 THER/PROPH/DIAG INJ SC/IM: CPT | Mod: PBBFAC | Performed by: OBSTETRICS & GYNECOLOGY

## 2025-01-21 PROCEDURE — 81025 URINE PREGNANCY TEST: CPT | Mod: PBBFAC | Performed by: OBSTETRICS & GYNECOLOGY

## 2025-01-21 RX ORDER — MEDROXYPROGESTERONE ACETATE 150 MG/ML
150 INJECTION, SUSPENSION INTRAMUSCULAR
Status: COMPLETED | OUTPATIENT
Start: 2025-01-21 | End: 2025-01-21

## 2025-01-21 RX ADMIN — MEDROXYPROGESTERONE ACETATE 150 MG: 150 INJECTION, SUSPENSION INTRAMUSCULAR at 01:01

## 2025-01-21 NOTE — PROGRESS NOTES
Postpartum Visit  Merle Hinojosa is a 19 y.o. female  is here for a postpartum visit. She is 6 weeks postpartum following a spontaneous vaginal delivery, of a male infant weighinlb 9.3oz, with Anesthesia: epidural. . The delivery was at 38w 0d.     Pregnancy was complicated by: GHTN.        Postpartum course has been uncomplicated.  Bleeding no bleeding. Bowel/ bladder function is normal. Her pap is due in .  Patient is not sexually active. Desired contraception method is Depo-Provera injections.   Postpartum depression screening: negative.  Baby's course has been uncomplicated. Baby is feeding by breast.     Patient denies any postpartum pain or vaginal bleeding. She states she is breast-feeding without discomfort. No feelings of worry, nervousness, or suicidal ideations. Patient is no longer taking the Procardia 60 mg. BP is wnl today. Counseled to stop the medication.    Additionally, patient reports multiple episodes of urge incontinence, but denies urinary leakage when sneezing or coughing.     No pain with sexual intercourse yesterday, condom used. Patient would like to take Depo injections for contraceptive use.   OB History    Para Term  AB Living   1 1 1     1   SAB IAB Ectopic Multiple Live Births         0 1      # Outcome Date GA Lbr Rashid/2nd Weight Sex Type Anes PTL Lv   1 Term 24 38w0d / 02:43 2.984 kg (6 lb 9.3 oz) M Vag-Spont EPI Y TOREY         Review of patient's allergies indicates:   Allergen Reactions    Fire ant      ants    Venom-honey bee Edema    Wasp venom        History reviewed. No pertinent past medical history.  Past Surgical History:   Procedure Laterality Date    FRACTURE SURGERY Left     Repair of left pinky finger     OB History          1    Para   1    Term   1            AB        Living   1         SAB        IAB        Ectopic        Multiple   0    Live Births   1               Family History   Problem Relation Name Age of  Onset    Cancer Paternal Aunt      Cancer Maternal Grandmother      Cancer Maternal Grandfather      Cancer Paternal Grandmother      No Known Problems Mother      No Known Problems Father      No Known Problems Sister      No Known Problems Brother      No Known Problems Sister       Social History     Tobacco Use    Smoking status: Every Day     Types: Vaping with nicotine     Passive exposure: Current    Smokeless tobacco: Never    Tobacco comments:     Former cigarettes   Substance Use Topics    Alcohol use: Never    Drug use: Never       Current Outpatient Medications   Medication Sig    EPINEPHrine (EPIPEN JR) 0.15 mg/0.3 mL pen injection Inject 0.15 mg into the muscle as needed for Anaphylaxis.    ibuprofen (ADVIL,MOTRIN) 600 MG tablet Take 1 tablet (600 mg total) by mouth every 6 (six) hours as needed (cramping).    PNV,calcium 72/iron/folic acid (PRENATAL VITAMIN) Tab Take 1 tablet by mouth once daily.     No current facility-administered medications for this visit.         ROS:  GENERAL: No fever, chills, fatigability.  VULVAR: No pain, no lesions and no itching.  VAGINAL: No relaxation, no itching, no discharge, no abnormal bleeding and no lesions.  ABDOMEN: No abdominal pain. Denies nausea. Denies vomiting. No diarrhea. No constipation  BREAST: Denies pain. No lumps. No discharge.  URINARY: No incontinence, no nocturia, no frequency and no dysuria.  CARDIOVASCULAR: No chest pain. No shortness of breath. No leg cramps.  NEUROLOGICAL: No headaches. No vision changes.      General appearance - alert, well appearing, and in no distress  Mental status - alert, oriented to person, place, and time  Skin - coloration normal for race, good turgor, warm to touch, no rashes  Abdomen - soft, nontender, nondistended, no masses or organomegaly  Pelvic - Exam chaperoned by female assistant.  External genitalia postpartum: normal, well-healed, without lesions or masses.  Normal female hair distribution. Adequate  perineal body. Perineum laceration repair site (episiotomy) well healed. Urethral meatus without lesions or prolapse. Urethra: no masses, tenderness, or scarring.  Bladder: without tenderness or masses.  Vaginal mucosa moist and pink, normal rugae, without lesions, abnormal discharge, or foul odor.  Cervix pink, no lesions, no cervical motion tenderness.  Uterus: midline, non tender, smooth, not enlarged, not prolapsed  No adnexal masses or tenderness.  Extremities - no edema, redness or tenderness in the calves or thighs      Merle was seen today for postpartum care.    Diagnoses and all orders for this visit:    Encounter for contraceptive management, unspecified type  -     POCT urine pregnancy    Postpartum care and examination    Urinary, incontinence, stress female  -     Ambulatory Referral/Consult to Physical/Occupational Therapy; Future        Discussed contraception - pt desires Depo  Counseling regarding resuming normal activities of exercise and work.  Postpartum precautions reviewed  Breast feeding support.  Refer to pelvic floor PT for urge incontinence.     Desires Depo Provera. Discussed benefits of LARC. Discussed alternatives of other forms of contraception. Discussed risks of irregular bleeding, decreased bone density, risk of benign brain tumor (though risk is not well documented). She verbalized her understanding and desires to proceed with this today. RTC q 3 months for Depo.    Routine follow up in 6 months for annual exam.

## 2025-01-31 ENCOUNTER — PATIENT MESSAGE (OUTPATIENT)
Dept: OBSTETRICS AND GYNECOLOGY | Facility: CLINIC | Age: 20
End: 2025-01-31
Payer: MEDICAID

## 2025-04-10 ENCOUNTER — CLINICAL SUPPORT (OUTPATIENT)
Dept: OBSTETRICS AND GYNECOLOGY | Facility: CLINIC | Age: 20
End: 2025-04-10
Payer: MEDICAID

## 2025-04-10 DIAGNOSIS — Z30.42 ENCOUNTER FOR DEPO-PROVERA CONTRACEPTION: ICD-10-CM

## 2025-04-10 DIAGNOSIS — Z30.9 ENCOUNTER FOR CONTRACEPTIVE MANAGEMENT, UNSPECIFIED TYPE: Primary | ICD-10-CM

## 2025-04-10 PROCEDURE — 99999 PR PBB SHADOW E&M-EST. PATIENT-LVL II: CPT | Mod: PBBFAC,,,

## 2025-04-10 PROCEDURE — 96372 THER/PROPH/DIAG INJ SC/IM: CPT | Mod: PBBFAC | Performed by: OBSTETRICS & GYNECOLOGY

## 2025-04-10 PROCEDURE — 99999PBSHW PR PBB SHADOW TECHNICAL ONLY FILED TO HB: Mod: PBBFAC,,,

## 2025-04-10 PROCEDURE — 99212 OFFICE O/P EST SF 10 MIN: CPT | Mod: PBBFAC

## 2025-04-10 RX ORDER — MEDROXYPROGESTERONE ACETATE 150 MG/ML
150 INJECTION, SUSPENSION INTRAMUSCULAR
Status: SHIPPED | OUTPATIENT
Start: 2025-04-10

## 2025-04-10 RX ADMIN — MEDROXYPROGESTERONE ACETATE 150 MG: 150 INJECTION, SUSPENSION INTRAMUSCULAR at 08:04

## 2025-06-12 NOTE — PROGRESS NOTES
Annual Gynecologic Exam    Assessment/Plan:  20 y.o.  presenting for her annual exam:    Problem List Items Addressed This Visit    None  Visit Diagnoses         Screening for malignant neoplasm of the cervix    -  Primary    Relevant Orders    ThinPrep Pap Test              Health Maintenance:    Birth control: Depo  Pregnancy plans: none   Safe relationship: ***  PCP: ***     Screening:  Last pap smear: NA  History of abnormal pap smears: NA      CC:   Chief Complaint   Patient presents with    Annual Exam     Pt state she has no concerns at present.        HPI:  20 y.o.  presents for her gynecologic annual exam.   Menses x 7 days  Last week and stop 3-4 days ago.  No pelvic pain.   No PMH changes.      Review of Systems: The following ROS was otherwise negative, except as noted in the HPI:  constitutional, HEENT, respiratory, cardiovascular, gastrointestinal, genitourinary, skin, musculoskeletal, neurological, psych    Primary Care Physician: No, Primary Doctor    Obstetric History  OB History    Para Term  AB Living   1 1 1   1   SAB IAB Ectopic Multiple Live Births      0 1      # Outcome Date GA Lbr Rashid/2nd Weight Sex Type Anes PTL Lv   1 Term 24 38w0d / 02:43 2.984 kg (6 lb 9.3 oz) M Vag-Spont EPI Y TOREY       Gynecologic History       Social History     Substance and Sexual Activity   Sexual Activity Yes    Partners: Male                   Menstrual History:   LMP: ***    Age of Menarche: ***   Menstrual Period: {Desc; regular/irre}   Interval Between Menses: ***   Duration of Menses: ***    Menstrual Flow: ***   Bleeding between menses: ***    Sexual History:    Contraception: see above   Currently {is/is not:83833} sexually active   {Denies/Admits to:18517} history of STIs   {Denies/Admits to:62490} sexual problems    Pap History:   History of abnormal pap smears: see above   Last pap: see above    Medical History:  History reviewed. No pertinent past medical  history.    Medications:  Medication List with Changes/Refills   Current Medications    EPINEPHRINE (EPIPEN JR) 0.15 MG/0.3 ML PEN INJECTION    Inject 0.15 mg into the muscle as needed for Anaphylaxis.    IBUPROFEN (ADVIL,MOTRIN) 600 MG TABLET    Take 1 tablet (600 mg total) by mouth every 6 (six) hours as needed (cramping).    PNV,CALCIUM 72/IRON/FOLIC ACID (PRENATAL VITAMIN) TAB    Take 1 tablet by mouth once daily.       Surgical History:  Past Surgical History:   Procedure Laterality Date    FRACTURE SURGERY Left 2009    Repair of left pinky finger       Allergies:  Review of patient's allergies indicates:   Allergen Reactions    Fire ant      ants    Venom-honey bee Edema    Wasp venom        Family History:  Family History   Problem Relation Name Age of Onset    Cancer Paternal Aunt      Cancer Maternal Grandmother      Cancer Maternal Grandfather      Cancer Paternal Grandmother      No Known Problems Mother      No Known Problems Father      No Known Problems Sister      No Known Problems Brother      No Known Problems Sister         Social History:  Social History     Socioeconomic History    Marital status: Single    Number of children: 0   Tobacco Use    Smoking status: Every Day     Types: Vaping with nicotine     Passive exposure: Current    Smokeless tobacco: Never    Tobacco comments:     Former cigarettes   Substance and Sexual Activity    Alcohol use: Never    Drug use: Never    Sexual activity: Yes     Partners: Male     Social Drivers of Health     Financial Resource Strain: Patient Declined (12/11/2024)    Overall Financial Resource Strain (CARDIA)     Difficulty of Paying Living Expenses: Patient declined   Food Insecurity: Patient Declined (12/11/2024)    Hunger Vital Sign     Worried About Running Out of Food in the Last Year: Patient declined     Ran Out of Food in the Last Year: Patient declined   Transportation Needs: Patient Declined (12/11/2024)    TRANSPORTATION NEEDS     Transportation  ": Patient declined   Stress: Patient Declined (12/11/2024)    [employer] - Occupational Stress Questionnaire     Feeling of Stress : Patient declined   Housing Stability: Patient Declined (12/11/2024)    Housing Stability Vital Sign     Unable to Pay for Housing in the Last Year: Patient declined     Homeless in the Last Year: Patient declined       Objective:  Body mass index is 16.84 kg/m².    /81 (BP Location: Left arm, Patient Position: Sitting)   Pulse 80   Ht 5' 5" (1.651 m)   Wt 45.9 kg (101 lb 3.2 oz)   LMP  (LMP Unknown)   Breastfeeding Yes   BMI 16.84 kg/m²     General: Alert, well appearing, no acute distress  Head: Normocephalic, atraumatic  Breasts: Symmetric, non-tender to palpation, no skin changes, palpable axillary lymph nodes or masses noted  Lungs: Unlabored respirations. Clear to auscultation bilaterally.  Cardiovascular: Regular rate and rhythm.   Abdomen: Soft, nontender, nondistended   Pelvic: Exam chaperoned by female assistant.    External: normal female genitalia, no masses or lesions   Vagina: moist, pink mucosa with rugae, physiologic discharge  Cervix: no masses or lesions, nontender  Uterus: approx *** weeks, mobile, midline, nontender  Adnexa: no masses or fullness, nontender  Rectovaginal: deferred  Extremities: No redness or tenderness  Skin: Well perfused, normal coloration and turgor, no lesions or rashes visualized  Neuro: Alert, oriented, normal speech, no focal deficits, moves extremities appropriately  Psych: Normal mood and behavior.     Micky Garcia MD     " and behavior.     Micky Garcia MD

## 2025-06-16 ENCOUNTER — OFFICE VISIT (OUTPATIENT)
Dept: OBSTETRICS AND GYNECOLOGY | Facility: CLINIC | Age: 20
End: 2025-06-16
Payer: MEDICAID

## 2025-06-16 VITALS
HEART RATE: 80 BPM | SYSTOLIC BLOOD PRESSURE: 128 MMHG | DIASTOLIC BLOOD PRESSURE: 81 MMHG | BODY MASS INDEX: 16.86 KG/M2 | WEIGHT: 101.19 LBS | HEIGHT: 65 IN

## 2025-06-16 DIAGNOSIS — Z12.4 SCREENING FOR MALIGNANT NEOPLASM OF THE CERVIX: Primary | ICD-10-CM

## 2025-06-16 DIAGNOSIS — Z01.411 ENCOUNTER FOR GYNECOLOGICAL EXAMINATION (GENERAL) (ROUTINE) WITH ABNORMAL FINDINGS: ICD-10-CM

## 2025-06-16 DIAGNOSIS — Z30.42 ENCOUNTER FOR SURVEILLANCE OF INJECTABLE CONTRACEPTIVE: ICD-10-CM

## 2025-06-16 PROCEDURE — 87591 N.GONORRHOEAE DNA AMP PROB: CPT | Mod: ,,, | Performed by: CLINICAL MEDICAL LABORATORY

## 2025-06-16 PROCEDURE — 99213 OFFICE O/P EST LOW 20 MIN: CPT | Mod: PBBFAC | Performed by: OBSTETRICS & GYNECOLOGY

## 2025-06-16 PROCEDURE — 88142 CYTOPATH C/V THIN LAYER: CPT | Mod: TC,GCY | Performed by: OBSTETRICS & GYNECOLOGY

## 2025-06-16 PROCEDURE — 99999 PR PBB SHADOW E&M-EST. PATIENT-LVL III: CPT | Mod: PBBFAC,,, | Performed by: OBSTETRICS & GYNECOLOGY

## 2025-06-16 PROCEDURE — 87491 CHLMYD TRACH DNA AMP PROBE: CPT | Mod: ,,, | Performed by: CLINICAL MEDICAL LABORATORY

## 2025-06-18 ENCOUNTER — RESULTS FOLLOW-UP (OUTPATIENT)
Dept: OBSTETRICS AND GYNECOLOGY | Facility: HOSPITAL | Age: 20
End: 2025-06-18

## 2025-06-18 LAB
GH SERPL-MCNC: NORMAL NG/ML
INSULIN SERPL-ACNC: NORMAL U[IU]/ML
LAB AP CLINICAL INFORMATION: NORMAL
LAB AP GYN INTERPRETATION: NEGATIVE
LAB AP PAP DISCLAIMER COMMENTS: NORMAL
RENIN PLAS-CCNC: NORMAL NG/ML/H

## 2025-06-19 LAB
CHLAMYDIA BY PCR: NEGATIVE
N. GONORRHOEAE (GC) BY PCR: NEGATIVE

## 2025-06-30 NOTE — PROGRESS NOTES
Pt is here for Depo Provera injection. No concerns at present. Pregnancy test negative. Tolerated injection to left deltoid well. Instructed to come back in 3 months for next dose.

## 2025-07-01 ENCOUNTER — CLINICAL SUPPORT (OUTPATIENT)
Dept: OBSTETRICS AND GYNECOLOGY | Facility: CLINIC | Age: 20
End: 2025-07-01
Payer: MEDICAID

## 2025-07-01 DIAGNOSIS — Z30.42 ENCOUNTER FOR DEPO-PROVERA CONTRACEPTION: Primary | ICD-10-CM

## 2025-07-01 LAB
B-HCG UR QL: NEGATIVE
CTP QC/QA: YES

## 2025-07-01 PROCEDURE — 96372 THER/PROPH/DIAG INJ SC/IM: CPT | Mod: PBBFAC | Performed by: OBSTETRICS & GYNECOLOGY

## 2025-07-01 PROCEDURE — 99999PBSHW POCT URINE PREGNANCY: Mod: PBBFAC,,,

## 2025-07-01 PROCEDURE — 99999PBSHW PR PBB SHADOW TECHNICAL ONLY FILED TO HB: Mod: PBBFAC,,,

## 2025-07-01 PROCEDURE — 99212 OFFICE O/P EST SF 10 MIN: CPT | Mod: PBBFAC

## 2025-07-01 PROCEDURE — 99999 PR PBB SHADOW E&M-EST. PATIENT-LVL II: CPT | Mod: PBBFAC,,,

## 2025-07-01 PROCEDURE — 81025 URINE PREGNANCY TEST: CPT | Mod: PBBFAC | Performed by: OBSTETRICS & GYNECOLOGY

## 2025-07-01 RX ADMIN — MEDROXYPROGESTERONE ACETATE 150 MG: 150 INJECTION, SUSPENSION INTRAMUSCULAR at 08:07
